# Patient Record
Sex: MALE | Race: WHITE | NOT HISPANIC OR LATINO | Employment: FULL TIME | URBAN - METROPOLITAN AREA
[De-identification: names, ages, dates, MRNs, and addresses within clinical notes are randomized per-mention and may not be internally consistent; named-entity substitution may affect disease eponyms.]

---

## 2017-01-17 ENCOUNTER — TRANSCRIBE ORDERS (OUTPATIENT)
Dept: LAB | Facility: CLINIC | Age: 22
End: 2017-01-17

## 2017-01-17 ENCOUNTER — APPOINTMENT (OUTPATIENT)
Dept: LAB | Facility: CLINIC | Age: 22
End: 2017-01-17
Payer: COMMERCIAL

## 2017-01-17 DIAGNOSIS — B99.9: Primary | ICD-10-CM

## 2017-01-17 LAB
BASOPHILS # BLD AUTO: 0.02 THOUSANDS/ΜL (ref 0–0.1)
BASOPHILS NFR BLD AUTO: 0 % (ref 0–1)
EOSINOPHIL # BLD AUTO: 0.27 THOUSAND/ΜL (ref 0–0.61)
EOSINOPHIL NFR BLD AUTO: 6 % (ref 0–6)
ERYTHROCYTE [DISTWIDTH] IN BLOOD BY AUTOMATED COUNT: 12.5 % (ref 11.6–15.1)
HCT VFR BLD AUTO: 44.6 % (ref 36.5–49.3)
HGB BLD-MCNC: 15.4 G/DL (ref 12–17)
LYMPHOCYTES # BLD AUTO: 1.44 THOUSANDS/ΜL (ref 0.6–4.47)
LYMPHOCYTES NFR BLD AUTO: 29 % (ref 14–44)
MCH RBC QN AUTO: 30.5 PG (ref 26.8–34.3)
MCHC RBC AUTO-ENTMCNC: 34.5 G/DL (ref 31.4–37.4)
MCV RBC AUTO: 88 FL (ref 82–98)
MONOCYTES # BLD AUTO: 0.52 THOUSAND/ΜL (ref 0.17–1.22)
MONOCYTES NFR BLD AUTO: 11 % (ref 4–12)
NEUTROPHILS # BLD AUTO: 2.69 THOUSANDS/ΜL (ref 1.85–7.62)
NEUTS SEG NFR BLD AUTO: 54 % (ref 43–75)
NRBC BLD AUTO-RTO: 0 /100 WBCS
PLATELET # BLD AUTO: 200 THOUSANDS/UL (ref 149–390)
PMV BLD AUTO: 11.2 FL (ref 8.9–12.7)
RBC # BLD AUTO: 5.05 MILLION/UL (ref 3.88–5.62)
WBC # BLD AUTO: 4.95 THOUSAND/UL (ref 4.31–10.16)

## 2017-01-17 PROCEDURE — 36415 COLL VENOUS BLD VENIPUNCTURE: CPT

## 2017-01-17 PROCEDURE — 85025 COMPLETE CBC W/AUTO DIFF WBC: CPT

## 2017-01-26 ENCOUNTER — ALLSCRIPTS OFFICE VISIT (OUTPATIENT)
Dept: OTHER | Facility: OTHER | Age: 22
End: 2017-01-26

## 2017-01-30 ENCOUNTER — ALLSCRIPTS OFFICE VISIT (OUTPATIENT)
Dept: OTHER | Facility: OTHER | Age: 22
End: 2017-01-30

## 2017-02-07 ENCOUNTER — ALLSCRIPTS OFFICE VISIT (OUTPATIENT)
Dept: OTHER | Facility: OTHER | Age: 22
End: 2017-02-07

## 2017-07-27 ENCOUNTER — ALLSCRIPTS OFFICE VISIT (OUTPATIENT)
Dept: OTHER | Facility: OTHER | Age: 22
End: 2017-07-27

## 2017-07-27 ENCOUNTER — GENERIC CONVERSION - ENCOUNTER (OUTPATIENT)
Dept: OTHER | Facility: OTHER | Age: 22
End: 2017-07-27

## 2017-07-27 PROCEDURE — 88305 TISSUE EXAM BY PATHOLOGIST: CPT | Performed by: PHYSICIAN ASSISTANT

## 2017-07-28 ENCOUNTER — LAB REQUISITION (OUTPATIENT)
Dept: LAB | Facility: HOSPITAL | Age: 22
End: 2017-07-28
Payer: COMMERCIAL

## 2017-07-28 DIAGNOSIS — L81.9 DISORDER OF PIGMENTATION: ICD-10-CM

## 2017-07-31 ENCOUNTER — ALLSCRIPTS OFFICE VISIT (OUTPATIENT)
Dept: OTHER | Facility: OTHER | Age: 22
End: 2017-07-31

## 2017-08-02 ENCOUNTER — GENERIC CONVERSION - ENCOUNTER (OUTPATIENT)
Dept: OTHER | Facility: OTHER | Age: 22
End: 2017-08-02

## 2017-08-11 ENCOUNTER — GENERIC CONVERSION - ENCOUNTER (OUTPATIENT)
Dept: OTHER | Facility: OTHER | Age: 22
End: 2017-08-11

## 2018-01-09 ENCOUNTER — GENERIC CONVERSION - ENCOUNTER (OUTPATIENT)
Dept: FAMILY MEDICINE CLINIC | Facility: CLINIC | Age: 23
End: 2018-01-09

## 2018-01-09 NOTE — RESULT NOTES
Verified Results  (1) TISSUE EXAM [id number] 11:15AM Gloria Gomez     Test Name Result Flag Reference   LAB AP CASE REPORT (Report)     Surgical Pathology Report             Case: [id number]                   Authorizing Provider: Manuel Blanc PA-C     Collected:      07/27/2017           Pathologist:      Jaciel Springer MD      Received:      07/28/2017 1257        Specimen:  Skin, Other, Right Mid Back   LAB AP FINAL DIAGNOSIS      A  Skin, Right Mid Back, shave biopsy:  - Seborrheic keratosis, pigmented  Interpretation performed at [place] [address]  Electronically signed by Jaciel Springer MD on 8/1/2017 at 11:15 AM   LAB AP SURGICAL ADDITIONAL INFORMATION (Report)     All controls performed with the immunohistochemical stains reported above   reacted appropriately  These tests were developed and their performance   characteristics determined by [facility] or   [facility]  They may not be cleared or approved by the [place]  Food and Drug Administration  The FDA has determined that such clearance   or approval is not necessary  These tests are used for clinical purposes  They should not be regarded as investigational or for research  This   laboratory has been approved by CLIA 88, designated as a high-complexity   laboratory and is qualified to perform these tests  LAB AP GROSS DESCRIPTION (Report)     A  The specimen is received in formalin, labeled with the patient's name   and hospital number, and is designated right mid back shave  The   specimen consists of one tan non-hairbearing irregular shaped portion of   skin measuring 1 0 x 0 6 cm with attached underlying soft tissue to a   depth of 0 2 cm  The surface exhibits a partially keratotic pigmented   macule which measures 0 7 by 0 5 x 0 2 cm and comes within 0 1 cm of   nearest peripheral margin  The margin of resection is inked green, and a   surface tips are inked red  Entirely submitted  One cassette  Bisected   lengthwise  Note: The estimated total formalin fixation time based upon information   provided by the submitting clinician and the standard processing schedule   is over 72 hours     LAB AP CLINICAL INFORMATION      Pigmented irregular mole right mid back

## 2018-01-10 NOTE — PROGRESS NOTES
Assessment    1  Acute bronchitis, unspecified organism (466 0) (J20 9)   2  Asthma (493 90) (J45 909)   3  Acute laryngitis (464 00) (J04 0)   4  Herpes labialis (054 9) (B00 1)    Plan  Acute bronchitis, unspecified organism    · Azithromycin 250 MG Oral Tablet; TAKE 2 TABLETS ON DAY 1 THEN TAKE 1  TABLET A DAY FOR 4 DAYS with food   · PredniSONE 10 MG Oral Tablet; 6-5-4-3-2-1 taper with food   · Promethazine-Codeine 6 25-10 MG/5ML Oral Syrup; TAKE 1 TSP Every 6 hours  PRN  Asthma    · Respiratory Equipment Nebulizer; Status:Complete;   Done: 27CFA5110  Herpes labialis    · ValACYclovir HCl - 1 GM Oral Tablet; Take 1 tablet daily    Discussion/Summary    Discussed condition with pt  Will treat his acute bronchitis with oral abx, Prednisone taper, cough syrup, and discussed OTC cold meds  To continue regularly prescribed allergy and asthma meds  Fever Care, ER instructions given  F/U 5-7 days if not resolved  I gave pt  Rx for a new nebulizer machine at his request and refilled his Valtrex 1 g Rx for long term use/suppression  Possible side effects of new medications were reviewed with the patient/guardian today  The treatment plan was reviewed with the patient/guardian  The patient/guardian understands and agrees with the treatment plan      Chief Complaint    1  Cold Symptoms  Patient c/o prod  cough (yellow), runny nose and laryngitis x one week  History of Present Illness  HPI: Pt  presents one week history of productive cough worse in the morning, hoarseness, runny nose/congestion intermittently, PND, ST  Denies fever, N/V/D  He has taken Mucinex, Symbicort, nebulizer  Needs Rx for a new nebulizer  Has history of asthma  Does not smoke  Has had a flu shot  His roommate's father passed away and the  was today so the past week has not been good for the pt  Review of Systems    Constitutional: no fever or chills, feels well, no tiredness, no recent weight loss or weight gain     ENT: as noted in HPI  Cardiovascular: no complaints of slow or fast heart rate, no chest pain, no palpitations, no leg claudication or lower extremity edema  Respiratory: as noted in HPI  Gastrointestinal: no complaints of abdominal pain, no constipation, no nausea or vomiting, no diarrhea or bloody stools  Genitourinary: no complaints of dysuria or incontinence, no hesitancy, no nocturia, no genital lesion, no inadequacy of penile erection  Active Problems    1  Asthma (493 90) (J45 909)   2  Esophagitis (530 10) (K20 9)   3  Irritable bowel syndrome (564 1) (K58 9)   4  Vitamin D deficiency (268 9) (E55 9)    Past Medical History    1  History of Abscess of left external ear (380 10) (H60 02)   2  History of Acute bacterial conjunctivitis (372 03) (H10 30)   3  History of Acute bronchitis (466 0) (J20 9)   4  History of Herpes simplex type 1 infection (054 9) (B00 9)   5  History of acute sinusitis (V12 69) (Z87 09)   6  History of conjunctivitis (V12 49) (Z86 69)   7  History of stomatitis (V12 79) (Z87 19)   8  History of Routine lab draw (V72 62) (Z00 00)    Family History    1  No pertinent family history    2  Family history of diabetes mellitus (V18 0) (Z83 3)    Social History    · Never A Smoker   · Social alcohol use (F10 99)  The social history was reviewed and is unchanged  Surgical History    1  History of Frenotomy    Current Meds   1  Allegra Allergy 180 MG Oral Tablet; TAKE 1 TABLET DAILY AS NEEDED; Therapy: 26LZB7028 to Recorded   2  Dicyclomine HCl - 10 MG Oral Capsule; Therapy: 41CEW4936 to (Evaluate:16Waj5984) Recorded   3  Flonase 50 MCG/ACT SUSP; USE 2 SPRAYS IN EACH NOSTRIL ONCE DAILY; Therapy: 33EEV5544 to Recorded   4  Montelukast Sodium 10 MG Oral Tablet; TAKE 1 TABLET BY MOUTH ONCE DAILY; Therapy: 80MZQ5694 to (Evaluate:19Jun2016)  Requested for: 38Kff9947; Last   Rx:83Iui5717 Ordered   5  ProAir  (90 Base) MCG/ACT Inhalation Aerosol Solution;    Therapy: 91IZI5878 to Recorded   6  Symbicort 160-4 5 MCG/ACT Inhalation Aerosol; inhale 2 puffs twice daily  rinse mouth   after use; Therapy: 92PJA9250 to (Anita Head)  Requested for: 86JLS8309; Last   Rx:27Nov2015 Ordered   7  ValACYclovir HCl - 1 GM Oral Tablet; Take 1 tablet daily; Therapy: 71LXG5652 to (Last Rx:55Alt2907)  Requested for: 28Kap4004 Ordered    The medication list was reviewed and updated today  Allergies    1  Cefzil TABS   2  Penicillins    Vitals   Recorded: 95AWV1760 07:24PM   Temperature 99 2 F   Heart Rate 82   Systolic 823   Diastolic 80   Height 5 ft 11 1 in   Weight 171 lb    BMI Calculated 23 78   BSA Calculated 1 98   O2 Saturation 96, RA     Physical Exam    Constitutional   General appearance: No acute distress, well appearing and well nourished  Ears, Nose, Mouth, and Throat   External inspection of ears and nose: Normal     Otoscopic examination: Tympanic membrance translucent with normal light reflex  Canals patent without erythema  Nasal mucosa, septum, and turbinates: Abnormal   B/L boggy erythematous turbinates  Oropharynx: Abnormal   PND and erythema; no exudates  Pulmonary   Respiratory effort: No increased work of breathing or signs of respiratory distress  Auscultation of lungs: Abnormal   B/L diffuse coarse breath sounds; no wheezes  Cardiovascular   Auscultation of heart: Normal rate and rhythm, normal S1 and S2, without murmurs  Lymphatic   Palpation of lymph nodes in neck: No lymphadenopathy  Psychiatric   Orientation to person, place and time: Normal     Mood and affect: Normal          Signatures   Electronically signed by :  Keon Kimball [facility]; Feb 5 2016 12:01PM EST                       (Author)    Electronically signed by : Baudilio Diaz DO; Feb 5 2016 12:12PM EST                       (Co-author)

## 2018-01-12 VITALS
DIASTOLIC BLOOD PRESSURE: 80 MMHG | BODY MASS INDEX: 24.52 KG/M2 | TEMPERATURE: 98.5 F | SYSTOLIC BLOOD PRESSURE: 116 MMHG | WEIGHT: 175.19 LBS | HEIGHT: 71 IN | OXYGEN SATURATION: 98 % | RESPIRATION RATE: 16 BRPM | HEART RATE: 86 BPM

## 2018-01-13 VITALS
HEIGHT: 71 IN | BODY MASS INDEX: 24.58 KG/M2 | RESPIRATION RATE: 16 BRPM | WEIGHT: 175.56 LBS | HEART RATE: 77 BPM | OXYGEN SATURATION: 98 % | DIASTOLIC BLOOD PRESSURE: 78 MMHG | SYSTOLIC BLOOD PRESSURE: 124 MMHG | TEMPERATURE: 98.4 F

## 2018-01-13 VITALS
TEMPERATURE: 97.8 F | OXYGEN SATURATION: 98 % | HEIGHT: 71 IN | HEART RATE: 76 BPM | SYSTOLIC BLOOD PRESSURE: 122 MMHG | WEIGHT: 172.56 LBS | DIASTOLIC BLOOD PRESSURE: 76 MMHG | BODY MASS INDEX: 24.16 KG/M2

## 2018-01-14 VITALS
WEIGHT: 161.38 LBS | DIASTOLIC BLOOD PRESSURE: 74 MMHG | BODY MASS INDEX: 21.39 KG/M2 | HEART RATE: 85 BPM | RESPIRATION RATE: 16 BRPM | SYSTOLIC BLOOD PRESSURE: 110 MMHG | TEMPERATURE: 97.8 F | HEIGHT: 73 IN | OXYGEN SATURATION: 97 %

## 2018-01-22 VITALS
BODY MASS INDEX: 22.99 KG/M2 | RESPIRATION RATE: 16 BRPM | OXYGEN SATURATION: 98 % | TEMPERATURE: 97 F | DIASTOLIC BLOOD PRESSURE: 72 MMHG | HEIGHT: 71 IN | HEART RATE: 68 BPM | WEIGHT: 164.19 LBS | SYSTOLIC BLOOD PRESSURE: 114 MMHG

## 2018-01-24 NOTE — PROGRESS NOTES
Assessment    1  Ganglion of right wrist (357 41) (M67 431)   2  Seborrheic keratosis (522 19) (L82 1)    Plan   Allergic rhinitis, Asthma    · Allegra-D Allergy & Congestion 180-240 MG Oral Tablet Extended Release 24 Hour;  TAKE 1 TABLET DAILY  Asthma    · ProAir  (90 Base) MCG/ACT Inhalation Aerosol Solution; INHALE 2  PUFFS Every 6 hours PRN  Asthma, PMH: History of acute sinusitis    · Montelukast Sodium 10 MG Oral Tablet; TAKE 1 TABLET BY MOUTH ONCE  DAILY  Herpes labialis    · ValACYclovir HCl - 1 GM Oral Tablet; Take 1 tablet daily  Pigmented skin lesion    · (1) TISSUE EXAM; Status:Active; Requested for:25Zgo0311; A : Right midback  A Date/Time: : 28DWC0757 10:13AM  A : Skin Shave  Impression: : Pigmented irregular mole right midback  PMH: History of allergy    · Allegra Allergy 180 MG Oral Tablet (Fexofenadine HCl)  Seborrheic keratosis    · Shave lesion trunk, arms, legs 0 6 - 1 0 cm - POC; Status:Active - Perform Order;  Requested for:67Bcx0364;     2 - [facility]  *  Status: Hold For - Scheduling  Requested for: 44SFQ8503  Ordered; For: Ganglion of right wrist;  Ordered By: Pari Lopez  Performed:   Due: 40WCR5672     Discussion/Summary    Discussed conditions with pt  I will refer him to orthopaedics for consult regarding ganglion cyst of the right wrist ([facility]  Regarding his skin lesion, it was excised and sent out for pathology  To keep biopsy site clean and dry and will call with path report once obtained  The treatment plan was reviewed with the patient/guardian  The patient/guardian understands and agrees with the treatment plan      Chief Complaint  Pt presents with a cyst on his R wrist, pt states he just noticed it yesterday  History of Present Illness  HPI: Pt  presents with two separate issues to discuss today  He reports he has what he believes is a cyst on his right ventral wrist which he noticed yesterday   He denies pain or numbness/paresthesias distally in the hand/fingers but his dexterity is affected when playing the piano  Denies known injury/trauma  He also reports he has a mole on his right mid back that he would like excised  He is concerned about irregular appearance and that it is difficult to monitor since he cannot see it easily and has gotten larger over time  Review of Systems    Constitutional: no fever or chills, feels well, no tiredness, no recent weight loss or weight gain  Cardiovascular: no complaints of slow or fast heart rate, no chest pain, no palpitations, no leg claudication or lower extremity edema  Respiratory: no complaints of shortness of breath, no wheezing or cough, no dyspnea on exertion, no orthopnea or PND  Gastrointestinal: no complaints of abdominal pain, no constipation, no nausea or vomiting, no diarrhea or bloody stools  Genitourinary: no complaints of dysuria or incontinence, no hesitancy, no nocturia, no genital lesion, no inadequacy of penile erection  Musculoskeletal: as noted in HPI  Integumentary: as noted in HPI  Neurological: no complaints of headache, no confusion, no numbness or tingling, no dizziness or fainting  Active Problems    1  Acute bronchitis, unspecified organism (466 0) (J20 9)   2  Acute dysfunction of both eustachian tubes (381 81) (H69 83)   3  Acute laryngitis (464 00) (J04 0)   4  Asthma (493 90) (J45 909)   5  Depression screening (V79 0) (Z13 89)   6  Eczema of left upper extremity (692 9) (L30 9)   7  Esophagitis (530 10) (K20 9)   8  Herpes labialis (054 9) (B00 1)   9  Irritable bowel syndrome (564 1) (K58 9)   10  Tinea corporis (110 5) (B35 4)   11  Vitamin D deficiency (268 9) (E55 9)    Past Medical History    1  History of Abscess of left external ear (380 10) (H60 02)   2  History of Acute bacterial conjunctivitis (372 03) (H10 30)   3  History of Acute bronchitis (466 0) (J20 9)   4   History of Herpes simplex type 1 infection [phone]  9) (B00 9)   5  History of acute sinusitis (V12 69) (Z87 09)   6  History of allergy (V15 09) (Z88 9)   7  History of conjunctivitis (V12 49) (Z86 69)   8  History of stomatitis (V12 79) (Z87 19)   9  History of Lipid screening (V77 91) (Z13 220)   10  History of Need for immunization against influenza (V04 81) (Z23)   11  History of Other fatigue (780 79) (R53 83)   12  History of Routine lab draw (V72 60) (Z00 00)   13  History of Sty, internal, left (373 12) (H00 026)    Family History  Mother    1  No pertinent family history  Father    2  Family history of diabetes mellitus (V18 0) (Z83 3)    Social History    · Never A Smoker   · Social alcohol use (Z78 9)  The social history was reviewed and is unchanged  Surgical History    1  History of Frenotomy    Current Meds   1  Allegra Allergy 180 MG Oral Tablet; TAKE 1 TABLET DAILY AS NEEDED; Therapy: 18IZG9013 to Recorded   2  Clotrimazole-Betamethasone 1-0 05 % External Cream; APPLY  AND RUB  IN A THIN   FILM TO AFFECTED AREAS TWICE DAILY  (AM AND PM); Therapy: 69TIS6084 to (Last Rx:26Jan2017)  Requested for: 26Jan2017 Ordered   3  Dulera 200-5 MCG/ACT Inhalation Aerosol; take 2 puffs every 12 hours; Therapy: 56VQH4609 to (Evaluate:56Opf7511)  Requested for: 65XMS1930; Last   Rx:15Nov2016 Ordered   4  Flonase 50 MCG/ACT SUSP; USE 2 SPRAYS IN EACH NOSTRIL ONCE DAILY; Therapy: 27CND8823 to Recorded   5  Montelukast Sodium 10 MG Oral Tablet; TAKE 1 TABLET BY MOUTH ONCE DAILY; Therapy: 97QTS8149 to (Evaluate:21Jan2018)  Requested for: 71LLJ2992; Last   Rx:26Jan2017 Ordered   6  ProAir  (90 Base) MCG/ACT Inhalation Aerosol Solution; INHALE 2 PUFFS   Every 6 hours PRN; Therapy: 02BJF9929 to (Last MN:11VNB3663)  Requested for: 21NYM5598 Ordered   7  Promethazine VC/Codeine 6 25-5-10 MG/5ML Oral Syrup; TAKE 5 ML EVERY 4 TO 6   HOURS AS NEEDED; Therapy: 94RDH9747 to (Evaluate:60Ufy4092); Last Rx:07Feb2017 Ordered   8   ValACYclovir HCl - 1 GM Oral Tablet; Take 1 tablet daily; Therapy: 55LTM3673 to (Last Rx:26Jan2017)  Requested for: 26Jan2017 Ordered    The medication list was reviewed and updated today  Allergies    1  Cefzil TABS   2  Penicillins    Vitals   Recorded: 27Jul2017 09:37AM   Temperature 97 F, Tympanic   Heart Rate 68   Respiration Quality Normal   Respiration 16   Systolic 816, RUE, Sitting   Diastolic 72, RUE, Sitting   Height 5 ft 11 in   Weight 164 lb 3 oz   BMI Calculated 22 9   BSA Calculated 1 94   O2 Saturation 98, RA   Pain Scale 0     Physical Exam    Constitutional   General appearance: No acute distress, well appearing and well nourished  Musculoskeletal   Inspection/palpation of joints, bones, and muscles: Abnormal   Right ventral wrist with what appears to be a non-tender small ganglion cyst; ROM of hand and wrist intact without difficulty; does not appear inflamed  Skin   Skin and subcutaneous tissue: Abnormal   Right mid back with approx 1 cm irregularly shaped raised pigmented mole with mildly inflamed outer border  Neurologic   Sensation: No sensory loss  Psychiatric   Orientation to person, place and time: Normal     Mood and affect: Normal     Additional Exam:  Vital signs were reviewed  Procedure          Procedure: shave biopsy  Indications for the procedure include the lesion has changed  Risks, benefits and alternatives were discussed with the patient  Procedure Note:  Anesthesia: 1 5 ml of lidocaine 2% with epinephrine  The lesion was located on the left back  The patient was prepped and draped in the usual sterile fashion using alcohol  a shave biopsy of the lesion was taken  The base of the wound was cauterized with cautery  Dressing: The wound was cleaned and a sterile dressing was placed  Specimen: the excised lesion was place in buffered formalin and sent for pathology  Post-Procedure:   Patient Status: the patient tolerated the procedure well   Complications: there were no complications  Follow-up in the office as needed  Signatures   Electronically signed by : JADA Driscoll;  Aug  2 2017  1:57PM EST                       (Author)    Electronically signed by : Denis Webb DO; Aug  2 2017  2:44PM EST

## 2018-02-12 DIAGNOSIS — F32.A DEPRESSION, UNSPECIFIED DEPRESSION TYPE: Primary | ICD-10-CM

## 2018-02-12 RX ORDER — BUPROPION HYDROCHLORIDE 300 MG/1
TABLET ORAL
Qty: 30 TABLET | Refills: 0 | Status: SHIPPED | OUTPATIENT
Start: 2018-02-12 | End: 2018-03-12 | Stop reason: SDUPTHER

## 2018-03-10 DIAGNOSIS — F32.A DEPRESSION, UNSPECIFIED DEPRESSION TYPE: Primary | ICD-10-CM

## 2018-03-11 RX ORDER — BUPROPION HYDROCHLORIDE 150 MG/1
TABLET ORAL
Qty: 30 TABLET | Refills: 1 | Status: SHIPPED | OUTPATIENT
Start: 2018-03-11 | End: 2018-05-20 | Stop reason: SDUPTHER

## 2018-03-12 DIAGNOSIS — F32.A DEPRESSION, UNSPECIFIED DEPRESSION TYPE: ICD-10-CM

## 2018-03-12 RX ORDER — BUPROPION HYDROCHLORIDE 300 MG/1
TABLET ORAL
Qty: 30 TABLET | Refills: 0 | Status: SHIPPED | OUTPATIENT
Start: 2018-03-12 | End: 2018-04-17 | Stop reason: SDUPTHER

## 2018-04-17 DIAGNOSIS — F32.A DEPRESSION, UNSPECIFIED DEPRESSION TYPE: ICD-10-CM

## 2018-04-17 RX ORDER — BUPROPION HYDROCHLORIDE 300 MG/1
TABLET ORAL
Qty: 30 TABLET | Refills: 0 | Status: SHIPPED | OUTPATIENT
Start: 2018-04-17 | End: 2018-05-13 | Stop reason: ALTCHOICE

## 2018-04-24 ENCOUNTER — OFFICE VISIT (OUTPATIENT)
Dept: FAMILY MEDICINE CLINIC | Facility: CLINIC | Age: 23
End: 2018-04-24
Payer: COMMERCIAL

## 2018-04-24 VITALS
OXYGEN SATURATION: 96 % | TEMPERATURE: 98.4 F | WEIGHT: 167.06 LBS | BODY MASS INDEX: 21.44 KG/M2 | DIASTOLIC BLOOD PRESSURE: 78 MMHG | SYSTOLIC BLOOD PRESSURE: 110 MMHG | HEART RATE: 82 BPM | HEIGHT: 74 IN | RESPIRATION RATE: 19 BRPM

## 2018-04-24 DIAGNOSIS — J45.41 MODERATE PERSISTENT ASTHMA WITH ACUTE EXACERBATION: ICD-10-CM

## 2018-04-24 DIAGNOSIS — J20.9 ACUTE BRONCHITIS, UNSPECIFIED ORGANISM: Primary | ICD-10-CM

## 2018-04-24 PROCEDURE — 99213 OFFICE O/P EST LOW 20 MIN: CPT | Performed by: PHYSICIAN ASSISTANT

## 2018-04-24 RX ORDER — PROMETHAZINE HYDROCHLORIDE, PHENYLEPHRINE HYDROCHLORIDE AND CODEINE PHOSPHATE 6.25; 5; 1 MG/5ML; MG/5ML; MG/5ML
5 SOLUTION ORAL
COMMUNITY
Start: 2017-01-30 | End: 2018-04-24

## 2018-04-24 RX ORDER — ALBUTEROL SULFATE 90 UG/1
2 AEROSOL, METERED RESPIRATORY (INHALATION) EVERY 6 HOURS PRN
COMMUNITY
Start: 2013-02-17 | End: 2018-05-13 | Stop reason: SDUPTHER

## 2018-04-24 RX ORDER — FEXOFENADINE HCL AND PSEUDOEPHEDRINE HCI 180; 240 MG/1; MG/1
1 TABLET, EXTENDED RELEASE ORAL DAILY
COMMUNITY
Start: 2017-07-27 | End: 2018-05-02 | Stop reason: SDUPTHER

## 2018-04-24 RX ORDER — VALACYCLOVIR HYDROCHLORIDE 1 G/1
1 TABLET, FILM COATED ORAL DAILY
COMMUNITY
Start: 2014-07-28 | End: 2018-07-27 | Stop reason: SDUPTHER

## 2018-04-24 RX ORDER — CLOTRIMAZOLE AND BETAMETHASONE DIPROPIONATE 10; .64 MG/G; MG/G
CREAM TOPICAL 2 TIMES DAILY
COMMUNITY
Start: 2016-05-20 | End: 2018-04-24

## 2018-04-24 RX ORDER — MONTELUKAST SODIUM 10 MG/1
1 TABLET ORAL DAILY
COMMUNITY
Start: 2014-06-10 | End: 2018-08-16 | Stop reason: SDUPTHER

## 2018-04-24 RX ORDER — FLUTICASONE PROPIONATE 50 MCG
2 SPRAY, SUSPENSION (ML) NASAL DAILY
COMMUNITY
Start: 2013-02-17

## 2018-04-24 RX ORDER — PROMETHAZINE HYDROCHLORIDE, PHENYLEPHRINE HYDROCHLORIDE AND CODEINE PHOSPHATE 6.25; 5; 1 MG/5ML; MG/5ML; MG/5ML
5 SOLUTION ORAL
Qty: 118 ML | Refills: 0 | Status: SHIPPED | OUTPATIENT
Start: 2018-04-24 | End: 2018-05-13 | Stop reason: ALTCHOICE

## 2018-04-24 RX ORDER — GENTAMICIN SULFATE 3 MG/ML
2 SOLUTION/ DROPS OPHTHALMIC EVERY 4 HOURS
COMMUNITY
Start: 2017-07-31 | End: 2018-04-24

## 2018-04-24 RX ORDER — LEVOFLOXACIN 500 MG/1
500 TABLET, FILM COATED ORAL EVERY 24 HOURS
Qty: 10 TABLET | Refills: 0 | Status: SHIPPED | OUTPATIENT
Start: 2018-04-24 | End: 2018-05-04

## 2018-04-24 NOTE — PROGRESS NOTES
Assessment/Plan:         Diagnoses and all orders for this visit:    Acute bronchitis, unspecified organism  -     levofloxacin (LEVAQUIN) 500 mg tablet; Take 1 tablet (500 mg total) by mouth every 24 hours for 10 days  -     Promethazine-Phenyleph-Codeine (PROMETHAZINE VC/CODEINE) 6 25-5-10 MG/5ML SYRP; Take 5 mL by mouth daily at bedtime as needed (cough)    Moderate persistent asthma with acute exacerbation  -     Mometasone Furo-Formoterol Fum (DULERA) 200-5 MCG/ACT AERO; Inhale 2 puffs daily    Other orders  -     fluticasone (FLONASE) 50 mcg/act nasal spray; 2 sprays into each nostril daily      Discussed OTC cold meds  Continue with regularly prescribed allergy/asthma meds  I refilled Dorisann Crooked for pt today  Fever Care, ER instructions given  F/U 5-7 days if not resolved  Chief Complaint   Patient presents with    Cold Like Symptoms     cough, congestion, chills and ST x 3 days  Has not taken anything for Sx's         Subjective:      Patient ID: Osiris Whitlock is a 24 y o  male  Pt presents with 3 day history of nasal congestion, PND, productive cough with brown/green phlegm that is worse at night, chest congestion/tightness/heaviness, wheezing  Denies fever, chills  He is using nebulizer and all of his regularly prescribed allergy and asthma meds  The following portions of the patient's history were reviewed and updated as appropriate: He  has no past medical history on file  He   Patient Active Problem List    Diagnosis Date Noted    Depression 08/23/2017    Allergic rhinitis 07/27/2017    Ganglion of right wrist 07/27/2017    Herpes labialis 02/04/2016    Vitamin D deficiency 12/22/2015    Esophagitis 12/12/2013    Irritable bowel syndrome 08/01/2013    Asthma 02/17/2013     He  has a past surgical history that includes EAR SURGERY (Left); TONSILECTOMY AND ADNOIDECTOMY; and Wrist surgery (Right)    His family history includes Ulster's disease in his father; Diabetes in his father; No Known Problems in his mother  He  reports that he has never smoked  He has never used smokeless tobacco  He reports that he drinks alcohol  His drug history is not on file  Current Outpatient Prescriptions   Medication Sig Dispense Refill    albuterol (PROAIR HFA) 90 mcg/act inhaler Inhale 2 puffs every 6 (six) hours as needed      buPROPion (WELLBUTRIN XL) 150 mg 24 hr tablet TAKE 1 TABLET EVERY DAY 30 tablet 1    buPROPion (WELLBUTRIN XL) 300 mg 24 hr tablet TAKE 1 TABLET BY MOUTH EVERY DAY 30 tablet 0    fexofenadine-pseudoephedrine (ALLEGRA-D 24) 180-240 MG per 24 hr tablet Take 1 tablet by mouth daily      fluticasone (FLONASE) 50 mcg/act nasal spray 2 sprays into each nostril daily      Mometasone Furo-Formoterol Fum (DULERA) 200-5 MCG/ACT AERO Inhale 2 puffs daily 3 Inhaler 1    montelukast (SINGULAIR) 10 mg tablet Take 1 tablet by mouth daily      valACYclovir (VALTREX) 1,000 mg tablet Take 1 tablet by mouth daily      levofloxacin (LEVAQUIN) 500 mg tablet Take 1 tablet (500 mg total) by mouth every 24 hours for 10 days 10 tablet 0    Promethazine-Phenyleph-Codeine (PROMETHAZINE VC/CODEINE) 6 25-5-10 MG/5ML SYRP Take 5 mL by mouth daily at bedtime as needed (cough) 118 mL 0     No current facility-administered medications for this visit  Current Outpatient Prescriptions on File Prior to Visit   Medication Sig    buPROPion (WELLBUTRIN XL) 150 mg 24 hr tablet TAKE 1 TABLET EVERY DAY    buPROPion (WELLBUTRIN XL) 300 mg 24 hr tablet TAKE 1 TABLET BY MOUTH EVERY DAY     No current facility-administered medications on file prior to visit  He is allergic to cefprozil and penicillins       Review of Systems   Constitutional: Negative  HENT: Positive for congestion and postnasal drip  Negative for sore throat  Respiratory: Positive for cough, chest tightness and wheezing  Cardiovascular: Negative  Gastrointestinal: Negative  Genitourinary: Negative            Objective:      BP 110/78 (BP Location: Left arm, Patient Position: Sitting, Cuff Size: Adult)   Pulse 82   Temp 98 4 °F (36 9 °C) (Tympanic)   Resp 19   Ht 6' 2" (1 88 m)   Wt 75 8 kg (167 lb 1 oz)   SpO2 96%   BMI 21 45 kg/m²          Physical Exam   Constitutional: He is oriented to person, place, and time  He appears well-developed and well-nourished  No distress  HENT:   Right Ear: Hearing, tympanic membrane, external ear and ear canal normal    Left Ear: Hearing, tympanic membrane, external ear and ear canal normal    Nose: Mucosal edema (B/L boggy turbinates) present  Mouth/Throat: Mucous membranes are normal  Posterior oropharyngeal erythema (PND) present  No oropharyngeal exudate  Neck: Neck supple  Cardiovascular: Normal rate, regular rhythm and normal heart sounds  Pulmonary/Chest: Effort normal  He has no wheezes  He has rhonchi (B/L diffuse coarse breath sounds heard throughout)  Lymphadenopathy:     He has no cervical adenopathy  Neurological: He is alert and oriented to person, place, and time  Psychiatric: He has a normal mood and affect  Vitals reviewed

## 2018-04-27 PROBLEM — J30.9 ALLERGIC RHINITIS: Status: ACTIVE | Noted: 2017-07-27

## 2018-04-27 PROBLEM — M67.431 GANGLION OF RIGHT WRIST: Status: ACTIVE | Noted: 2017-07-27

## 2018-05-02 DIAGNOSIS — J30.9 ALLERGIC RHINITIS, UNSPECIFIED SEASONALITY, UNSPECIFIED TRIGGER: Primary | ICD-10-CM

## 2018-05-03 RX ORDER — FEXOFENADINE HCL AND PSEUDOEPHEDRINE HCI 180; 240 MG/1; MG/1
TABLET, EXTENDED RELEASE ORAL
Qty: 90 TABLET | Refills: 1 | Status: SHIPPED | OUTPATIENT
Start: 2018-05-03 | End: 2018-05-13 | Stop reason: SDUPTHER

## 2018-05-13 ENCOUNTER — OFFICE VISIT (OUTPATIENT)
Dept: FAMILY MEDICINE CLINIC | Facility: CLINIC | Age: 23
End: 2018-05-13
Payer: COMMERCIAL

## 2018-05-13 VITALS
DIASTOLIC BLOOD PRESSURE: 60 MMHG | HEIGHT: 74 IN | SYSTOLIC BLOOD PRESSURE: 98 MMHG | WEIGHT: 160.8 LBS | RESPIRATION RATE: 18 BRPM | TEMPERATURE: 98.5 F | BODY MASS INDEX: 20.64 KG/M2 | HEART RATE: 81 BPM | OXYGEN SATURATION: 96 %

## 2018-05-13 DIAGNOSIS — J40 BRONCHITIS: ICD-10-CM

## 2018-05-13 DIAGNOSIS — J30.9 ALLERGIC RHINITIS, UNSPECIFIED SEASONALITY, UNSPECIFIED TRIGGER: ICD-10-CM

## 2018-05-13 DIAGNOSIS — J45.909 UNCOMPLICATED ASTHMA, UNSPECIFIED ASTHMA SEVERITY, UNSPECIFIED WHETHER PERSISTENT: ICD-10-CM

## 2018-05-13 DIAGNOSIS — J01.00 ACUTE NON-RECURRENT MAXILLARY SINUSITIS: Primary | ICD-10-CM

## 2018-05-13 PROCEDURE — 99213 OFFICE O/P EST LOW 20 MIN: CPT | Performed by: FAMILY MEDICINE

## 2018-05-13 RX ORDER — ALBUTEROL SULFATE 90 UG/1
2 AEROSOL, METERED RESPIRATORY (INHALATION) EVERY 6 HOURS PRN
Qty: 1 INHALER | Refills: 3 | Status: SHIPPED | OUTPATIENT
Start: 2018-05-13 | End: 2019-10-16 | Stop reason: SDUPTHER

## 2018-05-13 RX ORDER — FEXOFENADINE HCL AND PSEUDOEPHEDRINE HCI 180; 240 MG/1; MG/1
1 TABLET, EXTENDED RELEASE ORAL DAILY
Qty: 90 TABLET | Refills: 1 | Status: SHIPPED | OUTPATIENT
Start: 2018-05-13 | End: 2018-06-27 | Stop reason: SDUPTHER

## 2018-05-13 RX ORDER — DOXYCYCLINE HYCLATE 100 MG/1
100 CAPSULE ORAL EVERY 12 HOURS SCHEDULED
Qty: 20 CAPSULE | Refills: 0 | Status: SHIPPED | OUTPATIENT
Start: 2018-05-13 | End: 2018-05-23

## 2018-05-13 NOTE — PROGRESS NOTES
Assessment/Plan:    No problem-specific Assessment & Plan notes found for this encounter  Diagnoses and all orders for this visit:    Acute non-recurrent maxillary sinusitis  Comments:   Rx doxycycline given  Call further problems  Orders:  -     doxycycline hyclate (VIBRAMYCIN) 100 mg capsule; Take 1 capsule (100 mg total) by mouth every 12 (twelve) hours for 10 days    Bronchitis    Allergic rhinitis, unspecified seasonality, unspecified trigger  Comments:   Allegra D refilled today  Orders:  -     fexofenadine-pseudoephedrine (ALLEGRA-D 24) 180-240 MG per 24 hr tablet; Take 1 tablet by mouth daily    Uncomplicated asthma, unspecified asthma severity, unspecified whether persistent  Comments:   ProAir refilled today  Orders:  -     albuterol (PROAIR HFA) 90 mcg/act inhaler; Inhale 2 puffs every 6 (six) hours as needed for wheezing          Subjective:      Patient ID: Gui Eagle is a 24 y o  male  Pt was seen on 4/24 for URI/sinusitis and was given rx for levaquin w/ benefit    Symptoms have now returned in the past few days  No fevers/chills  The following portions of the patient's history were reviewed and updated as appropriate: allergies, current medications, past family history, past medical history, past social history, past surgical history and problem list     Review of Systems   Constitutional: Negative for chills and fever  HENT: Positive for congestion and postnasal drip  Respiratory: Positive for cough  Negative for shortness of breath  Cardiovascular: Negative  Objective:      BP 98/60   Pulse 81   Temp 98 5 °F (36 9 °C) (Tympanic)   Resp 18   Ht 6' 2" (1 88 m)   Wt 72 9 kg (160 lb 12 8 oz)   SpO2 96%   BMI 20 65 kg/m²          Physical Exam   Constitutional: He appears well-developed and well-nourished  HENT:   Turbinates inflamed   Neck: Normal range of motion  Neck supple     Pulmonary/Chest: Effort normal and breath sounds normal

## 2018-05-20 DIAGNOSIS — F32.A DEPRESSION, UNSPECIFIED DEPRESSION TYPE: ICD-10-CM

## 2018-05-21 RX ORDER — BUPROPION HYDROCHLORIDE 150 MG/1
TABLET ORAL
Qty: 30 TABLET | Refills: 1 | Status: SHIPPED | OUTPATIENT
Start: 2018-05-21 | End: 2018-07-27 | Stop reason: SDUPTHER

## 2018-05-22 DIAGNOSIS — F32.A DEPRESSION, UNSPECIFIED DEPRESSION TYPE: ICD-10-CM

## 2018-05-23 RX ORDER — BUPROPION HYDROCHLORIDE 300 MG/1
TABLET ORAL
Qty: 30 TABLET | Refills: 5 | Status: SHIPPED | OUTPATIENT
Start: 2018-05-23 | End: 2018-05-24 | Stop reason: SDUPTHER

## 2018-05-24 ENCOUNTER — TELEPHONE (OUTPATIENT)
Dept: FAMILY MEDICINE CLINIC | Facility: CLINIC | Age: 23
End: 2018-05-24

## 2018-05-24 DIAGNOSIS — F32.A DEPRESSION, UNSPECIFIED DEPRESSION TYPE: ICD-10-CM

## 2018-05-24 RX ORDER — BUPROPION HYDROCHLORIDE 300 MG/1
300 TABLET ORAL DAILY
Qty: 90 TABLET | Refills: 1 | Status: SHIPPED | OUTPATIENT
Start: 2018-05-24 | End: 2018-10-21 | Stop reason: SDUPTHER

## 2018-05-24 NOTE — TELEPHONE ENCOUNTER
Patient called to request a refill for Wellbutrin XL 24 hour tablet  300 mg for a 3 month supply  Sig:  Take 1 tablet by mouth every day  Please send to the [facility] in [place]

## 2018-06-27 DIAGNOSIS — J30.9 ALLERGIC RHINITIS, UNSPECIFIED SEASONALITY, UNSPECIFIED TRIGGER: ICD-10-CM

## 2018-06-27 RX ORDER — FEXOFENADINE HCL AND PSEUDOEPHEDRINE HCI 180; 240 MG/1; MG/1
TABLET, EXTENDED RELEASE ORAL
Qty: 90 TABLET | Refills: 1 | Status: SHIPPED | OUTPATIENT
Start: 2018-06-27 | End: 2018-09-18 | Stop reason: SDUPTHER

## 2018-07-21 DIAGNOSIS — F32.A DEPRESSION, UNSPECIFIED DEPRESSION TYPE: ICD-10-CM

## 2018-07-23 RX ORDER — BUPROPION HYDROCHLORIDE 150 MG/1
TABLET ORAL
Qty: 30 TABLET | Refills: 1 | OUTPATIENT
Start: 2018-07-23

## 2018-08-16 DIAGNOSIS — J45.909 ASTHMA, UNSPECIFIED ASTHMA SEVERITY, UNSPECIFIED WHETHER COMPLICATED, UNSPECIFIED WHETHER PERSISTENT: ICD-10-CM

## 2018-08-16 DIAGNOSIS — J30.9 ALLERGIC RHINITIS, UNSPECIFIED SEASONALITY, UNSPECIFIED TRIGGER: Primary | ICD-10-CM

## 2018-08-16 RX ORDER — MONTELUKAST SODIUM 10 MG/1
TABLET ORAL
Qty: 90 TABLET | Refills: 3 | Status: SHIPPED | OUTPATIENT
Start: 2018-08-16 | End: 2019-10-16 | Stop reason: SDUPTHER

## 2018-09-18 DIAGNOSIS — J30.9 ALLERGIC RHINITIS, UNSPECIFIED SEASONALITY, UNSPECIFIED TRIGGER: ICD-10-CM

## 2018-09-18 DIAGNOSIS — B00.9 HERPES INFECTION: ICD-10-CM

## 2018-09-19 ENCOUNTER — TELEPHONE (OUTPATIENT)
Dept: FAMILY MEDICINE CLINIC | Facility: CLINIC | Age: 23
End: 2018-09-19

## 2018-09-19 DIAGNOSIS — J45.41 MODERATE PERSISTENT ASTHMA WITH ACUTE EXACERBATION: ICD-10-CM

## 2018-09-19 RX ORDER — FEXOFENADINE HCL AND PSEUDOEPHEDRINE HCI 180; 240 MG/1; MG/1
1 TABLET, EXTENDED RELEASE ORAL DAILY
Qty: 90 TABLET | Refills: 3 | Status: SHIPPED | OUTPATIENT
Start: 2018-09-19 | End: 2018-11-27 | Stop reason: SDUPTHER

## 2018-09-19 RX ORDER — VALACYCLOVIR HYDROCHLORIDE 1 G/1
1000 TABLET, FILM COATED ORAL 2 TIMES DAILY
Qty: 180 TABLET | Refills: 3 | Status: SHIPPED | OUTPATIENT
Start: 2018-09-19 | End: 2019-10-16 | Stop reason: SDUPTHER

## 2018-09-19 NOTE — TELEPHONE ENCOUNTER
Pt returned missed call from prior call asking you to call them before sending rxs to pharmacy  Please call pt back

## 2018-09-20 NOTE — TELEPHONE ENCOUNTER
I spoke with pt  He requested higher quantity for his Valtrex Rx because when he gets a flare, he will increase to BID vs his usual once daily dosing  I gave him new Rx with quantity #180

## 2018-10-21 DIAGNOSIS — F32.A DEPRESSION, UNSPECIFIED DEPRESSION TYPE: ICD-10-CM

## 2018-10-21 DIAGNOSIS — J45.41 MODERATE PERSISTENT ASTHMA WITH ACUTE EXACERBATION: ICD-10-CM

## 2018-10-22 RX ORDER — BUPROPION HYDROCHLORIDE 300 MG/1
TABLET ORAL
Qty: 90 TABLET | Refills: 1 | Status: SHIPPED | OUTPATIENT
Start: 2018-10-22 | End: 2019-10-16 | Stop reason: SDUPTHER

## 2018-10-22 RX ORDER — MOMETASONE FUROATE AND FORMOTEROL FUMARATE DIHYDRATE 200; 5 UG/1; UG/1
2 AEROSOL RESPIRATORY (INHALATION) DAILY
Qty: 39 INHALER | Refills: 1 | Status: SHIPPED | OUTPATIENT
Start: 2018-10-22 | End: 2019-10-16 | Stop reason: SDUPTHER

## 2018-11-27 ENCOUNTER — OFFICE VISIT (OUTPATIENT)
Dept: FAMILY MEDICINE CLINIC | Facility: CLINIC | Age: 23
End: 2018-11-27
Payer: COMMERCIAL

## 2018-11-27 VITALS
SYSTOLIC BLOOD PRESSURE: 124 MMHG | OXYGEN SATURATION: 99 % | TEMPERATURE: 98.7 F | BODY MASS INDEX: 22.18 KG/M2 | RESPIRATION RATE: 18 BRPM | HEIGHT: 74 IN | WEIGHT: 172.8 LBS | HEART RATE: 104 BPM | DIASTOLIC BLOOD PRESSURE: 74 MMHG

## 2018-11-27 DIAGNOSIS — J30.9 ALLERGIC RHINITIS, UNSPECIFIED SEASONALITY, UNSPECIFIED TRIGGER: ICD-10-CM

## 2018-11-27 DIAGNOSIS — J40 BRONCHITIS: Primary | ICD-10-CM

## 2018-11-27 DIAGNOSIS — R10.9 ABDOMINAL CRAMPING: ICD-10-CM

## 2018-11-27 DIAGNOSIS — J45.41 MODERATE PERSISTENT ASTHMA WITH ACUTE EXACERBATION: ICD-10-CM

## 2018-11-27 DIAGNOSIS — R19.5 LOOSE STOOLS: ICD-10-CM

## 2018-11-27 DIAGNOSIS — K14.3 TONGUE COATING: ICD-10-CM

## 2018-11-27 DIAGNOSIS — J02.9 SORE THROAT: ICD-10-CM

## 2018-11-27 DIAGNOSIS — B37.0 THRUSH: ICD-10-CM

## 2018-11-27 PROCEDURE — 99214 OFFICE O/P EST MOD 30 MIN: CPT | Performed by: PHYSICIAN ASSISTANT

## 2018-11-27 PROCEDURE — 87102 FUNGUS ISOLATION CULTURE: CPT | Performed by: PHYSICIAN ASSISTANT

## 2018-11-27 PROCEDURE — 3008F BODY MASS INDEX DOCD: CPT | Performed by: PHYSICIAN ASSISTANT

## 2018-11-27 PROCEDURE — 87106 FUNGI IDENTIFICATION YEAST: CPT | Performed by: PHYSICIAN ASSISTANT

## 2018-11-27 RX ORDER — TIZANIDINE 4 MG/1
TABLET ORAL
Refills: 3 | COMMUNITY
Start: 2018-11-18 | End: 2022-08-03 | Stop reason: SDUPTHER

## 2018-11-27 RX ORDER — DICYCLOMINE HCL 20 MG
20 TABLET ORAL EVERY 6 HOURS
Qty: 30 TABLET | Refills: 0 | Status: SHIPPED | OUTPATIENT
Start: 2018-11-27 | End: 2020-12-14 | Stop reason: ALTCHOICE

## 2018-11-27 RX ORDER — FEXOFENADINE HCL AND PSEUDOEPHEDRINE HCI 180; 240 MG/1; MG/1
1 TABLET, EXTENDED RELEASE ORAL DAILY
Qty: 90 TABLET | Refills: 1 | Status: SHIPPED | OUTPATIENT
Start: 2018-11-27 | End: 2019-10-16 | Stop reason: SDUPTHER

## 2018-11-27 RX ORDER — AZITHROMYCIN 250 MG/1
TABLET, FILM COATED ORAL
Qty: 6 TABLET | Refills: 0 | Status: SHIPPED | OUTPATIENT
Start: 2018-11-27 | End: 2018-11-28 | Stop reason: SDUPTHER

## 2018-11-27 RX ORDER — PREDNISONE 10 MG/1
TABLET ORAL
Qty: 15 TABLET | Refills: 0 | Status: SHIPPED | OUTPATIENT
Start: 2018-11-27 | End: 2019-06-20 | Stop reason: ALTCHOICE

## 2018-11-27 NOTE — PATIENT INSTRUCTIONS
Bentyl as needed for abdomianl cramping and loose stools/diarrhea  START A PROBIOTIC  Take prednisone taper with food  Use nystatin mouth rinse 4 times a day  If you use an inhaler please rinse out mouth well after use

## 2018-11-27 NOTE — PROGRESS NOTES
Assessment/Plan:      Diagnoses and all orders for this visit:    Bronchitis  -     predniSONE 10 mg tablet; 5,4,3,2,1  -     azithromycin (ZITHROMAX) 250 mg tablet; Take 2 tabs PO daily x 1 day, then 1 tab PO daily x 4 days    Sore throat  -     predniSONE 10 mg tablet; 5,4,3,2,1    Moderate persistent asthma with acute exacerbation    Thrush  -     nystatin (MYCOSTATIN) 100,000 units/mL suspension; Apply 5 mL (500,000 Units total) to the mouth or throat 4 (four) times a day  -     Fungal culture; Future  -     Fungal culture    Loose stools  -     dicyclomine (BENTYL) 20 mg tablet; Take 1 tablet (20 mg total) by mouth every 6 (six) hours As needed for diarrhea or abdominal cramping    Abdominal cramping  -     dicyclomine (BENTYL) 20 mg tablet; Take 1 tablet (20 mg total) by mouth every 6 (six) hours As needed for diarrhea or abdominal cramping    Allergic rhinitis, unspecified seasonality, unspecified trigger  Comments:   Allegra D refilled today  Orders:  -     fexofenadine-pseudoephedrine (ALLEGRA-D 24) 180-240 MG per 24 hr tablet; Take 1 tablet by mouth daily    Tongue coating  -     Fungal culture; Future  -     Fungal culture    Other orders  -     tiZANidine (ZANAFLEX) 4 mg tablet; TAKE 1/2 A TABLET EVERY 8 HOURS AS NEEDED       Patient is a 57-year-old male presenting today for multiple issues  His main concern today is an ongoing sore throat as well as some persistent chest tightness and cough and he has been using his nebulizer and his rescue inhaler a lot more than usual   He also notes a white coating on his tongue lately  He is concerned because he states he knows that typically it turned into bronchitis which is not good for him, he states  There is a white coating on his tongue and I do question thrush causing the sore throat and the coating as a result of using his inhalers more  He also uses Mandie Finely for asthma on a regular basis and admits he is not rinse out his mouth consistently    A swab was obtained to send out for fungal culture to confirm  In the meantime I will place him on nystatin rinse and spit regimen  I explained him the importance of rinsing his mouth out after every use of the inhalers  On exam I do not see any evidence of strep and possibly bronchitis but no specific bacterial evidence  I do not feel that he immediately needs an antibiotic at this time but I did give him a printed prescription for azithromycin that he can start his discretion  However, with patient's increased use of his inhalers I  Do feel he  Would benefit from a course of prednisone to help him breathe and help the cough  Risks and benefits and potential side effects of prednisone discussed and he will use a 5 day taper as directed  His Allegra D was refilled for him today  He also has noticed some lower abdominal discomfort and stomach feeling upset as well as some loose stools on occasion with the exact etiology unclear  His exam is unremarkable today  I will try him on a course of Bentyl in addition to a daily probiotic  I am uncertain if this would have anything to do with an oral thrush infection or from the increased use of inhalers  This is why would like to also avoid an antibiotic if possible  He was advised to take the prednisone with food  We will see how patient does with the treatments during this time  We will call him with culture results when available and follow up with him at that time  He certainly can call sooner with any concerns or worsening symptoms despite treatment  Chief Complaint   Patient presents with    Cold Like Symptoms     not a chronic cough, but is coughing very little, chest tightness, sore throat x 4 days post nasal     Diarrhea     x 3 weeks        Subjective:     Patient ID: Paxton Minor is a 24 y o  male  22y/o male here today for sore throat past few weeks  Also persistent cough, chest tightness and chest congestion   Has been using nebulizer and rescue inhaler a lot  Also white coating on tongue  He has dulera he takes daily  He is also using mucinex  Singulair and allegra D  He states he knows how he gets and  Typically will     Also having stomach issues past 3 weeks  Diarrhea (more so loose) and stomach upset  No change in BM routine  Stomach upset at random, lower abdomen  No recent travel outside country  No blood in stool  No recent abx use  No nausea or vomiting  Review of Systems   Constitutional: Positive for fatigue  Negative for fever  HENT: Positive for sore throat  Respiratory: Positive for cough, chest tightness and shortness of breath  Cardiovascular: Negative  Gastrointestinal:        As in HPI   Genitourinary: Negative  Neurological: Negative  Psychiatric/Behavioral: Negative  The following portions of the patient's history were reviewed and updated as appropriate: allergies, current medications, past family history, past medical history, past social history, past surgical history and problem list       Objective:     Physical Exam   Constitutional: He is oriented to person, place, and time  He appears well-developed and well-nourished  No distress  HENT:   Head: Normocephalic  Right Ear: Tympanic membrane and ear canal normal    Left Ear: Tympanic membrane and ear canal normal    Nose: Nose normal    Mouth/Throat: Oropharynx is clear and moist    Tongue with minimal white coating   Neck: Neck supple  Cardiovascular: Normal rate, regular rhythm and normal heart sounds  Pulmonary/Chest: Effort normal and breath sounds normal    Abdominal: Normal appearance and bowel sounds are normal  There is no tenderness  Lymphadenopathy:     He has no cervical adenopathy  Neurological: He is alert and oriented to person, place, and time  Psychiatric: His mood appears anxious  Vitals reviewed        Vitals:    11/27/18 1526   BP: 124/74   BP Location: Left arm   Patient Position: Sitting   Cuff Size: Adult Pulse: 104   Resp: 18   Temp: 98 7 °F (37 1 °C)   TempSrc: Tympanic   SpO2: 99%   Weight: 78 4 kg (172 lb 12 8 oz)   Height: 6' 2" (1 88 m)

## 2018-11-28 DIAGNOSIS — J40 BRONCHITIS: ICD-10-CM

## 2018-11-28 RX ORDER — AZITHROMYCIN 250 MG/1
TABLET, FILM COATED ORAL
Qty: 6 TABLET | Refills: 0 | Status: SHIPPED | OUTPATIENT
Start: 2018-11-28 | End: 2018-11-30 | Stop reason: SDUPTHER

## 2018-11-30 DIAGNOSIS — J40 BRONCHITIS: ICD-10-CM

## 2018-11-30 RX ORDER — AZITHROMYCIN 250 MG/1
TABLET, FILM COATED ORAL
Qty: 6 TABLET | Refills: 0 | Status: SHIPPED | OUTPATIENT
Start: 2018-11-30 | End: 2018-12-05

## 2018-11-30 NOTE — TELEPHONE ENCOUNTER
Pt called req a refill    Azithromycin (ZITHROMAX) 250 mg tablet  Take 2 tabs PO daily x 1 day, then 1 tab PO daily x 4 days  Qty: 6 tablet    Please send to [facility] in [facility], [id number]      Pt stated he apologizes for the inconvenience of requesting this script for the third time  He stated his dog got a hold of the last 4 tabs so now he doesn't have them to take

## 2018-12-10 LAB — FUNGUS SPEC CULT: ABNORMAL

## 2018-12-24 ENCOUNTER — OFFICE VISIT (OUTPATIENT)
Dept: URGENT CARE | Facility: CLINIC | Age: 23
End: 2018-12-24
Payer: COMMERCIAL

## 2018-12-24 ENCOUNTER — TELEPHONE (OUTPATIENT)
Dept: FAMILY MEDICINE CLINIC | Facility: CLINIC | Age: 23
End: 2018-12-24

## 2018-12-24 VITALS
TEMPERATURE: 98.1 F | RESPIRATION RATE: 16 BRPM | SYSTOLIC BLOOD PRESSURE: 128 MMHG | BODY MASS INDEX: 22.59 KG/M2 | DIASTOLIC BLOOD PRESSURE: 66 MMHG | WEIGHT: 166.8 LBS | HEART RATE: 80 BPM | HEIGHT: 72 IN | OXYGEN SATURATION: 99 %

## 2018-12-24 DIAGNOSIS — S05.02XD CORNEAL ABRASION, LEFT, SUBSEQUENT ENCOUNTER: Primary | ICD-10-CM

## 2018-12-24 PROCEDURE — G0382 LEV 3 HOSP TYPE B ED VISIT: HCPCS | Performed by: PHYSICIAN ASSISTANT

## 2018-12-24 PROCEDURE — S9083 URGENT CARE CENTER GLOBAL: HCPCS | Performed by: PHYSICIAN ASSISTANT

## 2018-12-24 NOTE — TELEPHONE ENCOUNTER
Pt's mom called stating pt was in the ER yesterday  Pt was hit in the left eye by the dog's nose  Pt was there as well  Leilani Yuen stated they gave him drops for the pt to use and they are the only thing that seem to be helping with the pain, she is asking if a script can be sent over to the pharmacy for the drops  She could not give me the name of the drops at this time, told me I should be able to see it    Advised the pt should be seen for a follow up, tried to schedule for Wednesday, pt wanted to do virtual appointment today  Advised we don't do virtual appointments here  Leialni Yuen stated "well [facility] does, so why not"  Advised she would have to go online for the virtual appointments   Pt would like a call back to his mom advising what to do, [phone]

## 2018-12-24 NOTE — TELEPHONE ENCOUNTER
The pain relieving eyedrops that are given in the emergency room are only used for the purposes of doing an exam   They are not meant to be continued afterwards  In fact  They are usually strictly advised against after the fact  Those drops can actually mask the symptoms if the eye is getting worse or infection is present    The antibiotic ointment should be continued and ibuprofen up to 800 mg every 8 hr can be taken for pain

## 2018-12-26 NOTE — TELEPHONE ENCOUNTER
Called pt's mom back, advised we did see he went back to urgent care and we wanted to follow up to see how he is feeling today  Called pt, left message on his vm inq how he is feeling today, advised of message from Dr Shae Winston  Also stated if he would like to schedule a follow up, he should give us a call back

## 2018-12-28 NOTE — PROGRESS NOTES
[id number]        NAME: Lauren Gupta is a 24 y o  male  : 1995    MRN: [id number]  DATE: 2018  TIME: 8:08 AM    Assessment and Plan   Corneal abrasion, left, subsequent encounter [S05  02XD]  1  Corneal abrasion, left, subsequent encounter       Pt instructed to stop using the tetracaine that he acquired from the ED  He also requested a prescription for the drops  He was educated on the danger and permanent damage to his vision that could occur from prolonged use of the drops  Encouraged to continue to use the antibiotic ointment and follow up with his ophthalmologist      Patient Instructions     Patient Instructions   Follow up with your ophthalmologist as soon as possible  Motrin and tylenol for pain          Proceed to  ER if symptoms worsen  Chief Complaint     Chief Complaint   Patient presents with    Eye Pain     Pt reports dog head butting him in the left eye  Reports being seen in the ER last night         History of Present Illness       Pt presents with cc of left eye pain  He was evaluated in the ED yesterday after his dog jumped up and his snout hit him in the left eye  His exam yesterday did not show any corneal abrasion but he is being treated as such with antibiotic ointment  He states his eye is very painful and the only thing that helped was the tetracaine that they used in the ED  He denies any change in vision  No pain with eye movement  No FB sensation  Pt admits that he took the tetracaine from the ED and has been using it every couple of hours  Review of Systems   Review of Systems   Constitutional: Negative  Eyes: Positive for pain  Negative for photophobia, discharge, redness, itching and visual disturbance  Skin: Negative  Neurological: Negative            Current Medications       Current Outpatient Prescriptions:     albuterol (PROAIR HFA) 90 mcg/act inhaler, Inhale 2 puffs every 6 (six) hours as needed for wheezing, Disp: 1 Inhaler, Rfl: 3    buPROPion (WELLBUTRIN XL) 150 mg 24 hr tablet, TAKE 1 TABLET BY MOUTH EVERY DAY, Disp: 90 tablet, Rfl: 1    buPROPion (WELLBUTRIN XL) 300 mg 24 hr tablet, TAKE 1 TABLET BY MOUTH EVERY DAY, Disp: 90 tablet, Rfl: 1    dicyclomine (BENTYL) 20 mg tablet, Take 1 tablet (20 mg total) by mouth every 6 (six) hours As needed for diarrhea or abdominal cramping, Disp: 30 tablet, Rfl: 0    DULERA 200-5 MCG/ACT inhaler, INHALE 2 PUFFS DAILY, Disp: 39 Inhaler, Rfl: 1    fexofenadine-pseudoephedrine (ALLEGRA-D 24) 180-240 MG per 24 hr tablet, Take 1 tablet by mouth daily, Disp: 90 tablet, Rfl: 1    fluticasone (FLONASE) 50 mcg/act nasal spray, 2 sprays into each nostril daily, Disp: , Rfl:     montelukast (SINGULAIR) 10 mg tablet, TAKE 1 TABLET BY MOUTH EVERY DAY, Disp: 90 tablet, Rfl: 3    nystatin (MYCOSTATIN) 100,000 units/mL suspension, Apply 5 mL (500,000 Units total) to the mouth or throat 4 (four) times a day, Disp: 473 mL, Rfl: 0    predniSONE 10 mg tablet, 5,4,3,2,1, Disp: 15 tablet, Rfl: 0    tiZANidine (ZANAFLEX) 4 mg tablet, TAKE 1/2 A TABLET EVERY 8 HOURS AS NEEDED, Disp: , Rfl: 3    valACYclovir (VALTREX) 1,000 mg tablet, Take 1 tablet (1,000 mg total) by mouth 2 (two) times a day, Disp: 180 tablet, Rfl: 3    Current Allergies     Allergies as of 12/24/2018 - Reviewed 12/24/2018   Allergen Reaction Noted    Cefprozil Hives 07/13/2012    Penicillins Hives 07/13/2012            The following portions of the patient's history were reviewed and updated as appropriate: allergies, current medications, past family history, past medical history, past social history, past surgical history and problem list      History reviewed  No pertinent past medical history      Past Surgical History:   Procedure Laterality Date    EAR SURGERY Left     TONSILECTOMY AND ADNOIDECTOMY      WRIST SURGERY Right        Family History   Problem Relation Age of Onset    No Known Problems Mother     Diabetes Father     [place]'s disease Father     Substance Abuse Neg Hx     Alcohol abuse Neg Hx          Medications have been verified  Objective   /66 (BP Location: Right arm)   Pulse 80   Temp 98 1 °F (36 7 °C) (Tympanic)   Resp 16   Ht 6' (1 829 m)   Wt 75 7 kg (166 lb 12 8 oz)   SpO2 99%   BMI 22 62 kg/m²        Physical Exam     Physical Exam   Constitutional: He is oriented to person, place, and time  He appears well-developed  No distress  Eyes: Pupils are equal, round, and reactive to light  Conjunctivae and EOM are normal  Right eye exhibits no discharge  Left eye exhibits no discharge  Left upper eyelid with mild swelling  No periorbital erythema  Neck: Normal range of motion  Cardiovascular: Normal rate and regular rhythm  Pulmonary/Chest: Effort normal and breath sounds normal    Neurological: He is alert and oriented to person, place, and time  Skin: Skin is warm and dry  Nursing note and vitals reviewed

## 2019-01-17 DIAGNOSIS — F32.A DEPRESSION, UNSPECIFIED DEPRESSION TYPE: ICD-10-CM

## 2019-01-18 RX ORDER — BUPROPION HYDROCHLORIDE 150 MG/1
TABLET ORAL
Qty: 90 TABLET | Refills: 1 | Status: SHIPPED | OUTPATIENT
Start: 2019-01-18 | End: 2019-06-19 | Stop reason: SDUPTHER

## 2019-04-08 ENCOUNTER — TELEPHONE (OUTPATIENT)
Dept: FAMILY MEDICINE CLINIC | Facility: CLINIC | Age: 24
End: 2019-04-08

## 2019-06-19 DIAGNOSIS — F32.A DEPRESSION, UNSPECIFIED DEPRESSION TYPE: ICD-10-CM

## 2019-06-19 RX ORDER — BUPROPION HYDROCHLORIDE 150 MG/1
150 TABLET ORAL DAILY
Qty: 30 TABLET | Refills: 0 | Status: SHIPPED | OUTPATIENT
Start: 2019-06-19 | End: 2019-07-26 | Stop reason: SDUPTHER

## 2019-06-20 ENCOUNTER — TELEPHONE (OUTPATIENT)
Dept: FAMILY MEDICINE CLINIC | Facility: CLINIC | Age: 24
End: 2019-06-20

## 2019-06-20 DIAGNOSIS — J45.41 MODERATE PERSISTENT ASTHMA WITH ACUTE EXACERBATION: Primary | ICD-10-CM

## 2019-06-20 RX ORDER — METHYLPREDNISOLONE 4 MG/1
TABLET ORAL
Qty: 21 EACH | Refills: 0 | Status: SHIPPED | OUTPATIENT
Start: 2019-06-20 | End: 2020-01-27 | Stop reason: ALTCHOICE

## 2019-06-20 RX ORDER — AZITHROMYCIN 250 MG/1
TABLET, FILM COATED ORAL
Qty: 6 TABLET | Refills: 0 | Status: SHIPPED | OUTPATIENT
Start: 2019-06-20 | End: 2019-06-25

## 2019-06-20 RX ORDER — ALBUTEROL SULFATE 2.5 MG/3ML
2.5 SOLUTION RESPIRATORY (INHALATION) EVERY 6 HOURS PRN
Qty: 25 VIAL | Refills: 0 | Status: SHIPPED | OUTPATIENT
Start: 2019-06-20 | End: 2019-10-16 | Stop reason: SDUPTHER

## 2019-06-28 ENCOUNTER — DOCUMENTATION (OUTPATIENT)
Dept: FAMILY MEDICINE CLINIC | Facility: CLINIC | Age: 24
End: 2019-06-28

## 2019-06-28 ENCOUNTER — TELEPHONE (OUTPATIENT)
Dept: FAMILY MEDICINE CLINIC | Facility: CLINIC | Age: 24
End: 2019-06-28

## 2019-07-26 DIAGNOSIS — F32.A DEPRESSION, UNSPECIFIED DEPRESSION TYPE: ICD-10-CM

## 2019-07-26 RX ORDER — BUPROPION HYDROCHLORIDE 150 MG/1
TABLET ORAL
Qty: 90 TABLET | Refills: 1 | Status: SHIPPED | OUTPATIENT
Start: 2019-07-26 | End: 2019-10-16 | Stop reason: SDUPTHER

## 2019-07-27 DIAGNOSIS — F32.A DEPRESSION, UNSPECIFIED DEPRESSION TYPE: ICD-10-CM

## 2019-07-29 RX ORDER — BUPROPION HYDROCHLORIDE 300 MG/1
TABLET ORAL
Qty: 90 TABLET | Refills: 1 | OUTPATIENT
Start: 2019-07-29

## 2019-09-09 DIAGNOSIS — J45.909 ASTHMA, UNSPECIFIED ASTHMA SEVERITY, UNSPECIFIED WHETHER COMPLICATED, UNSPECIFIED WHETHER PERSISTENT: ICD-10-CM

## 2019-09-09 DIAGNOSIS — J30.9 ALLERGIC RHINITIS, UNSPECIFIED SEASONALITY, UNSPECIFIED TRIGGER: ICD-10-CM

## 2019-09-09 RX ORDER — MONTELUKAST SODIUM 10 MG/1
TABLET ORAL
Qty: 90 TABLET | Refills: 0 | OUTPATIENT
Start: 2019-09-09

## 2019-10-16 ENCOUNTER — OFFICE VISIT (OUTPATIENT)
Dept: FAMILY MEDICINE CLINIC | Facility: CLINIC | Age: 24
End: 2019-10-16
Payer: COMMERCIAL

## 2019-10-16 VITALS
BODY MASS INDEX: 23.06 KG/M2 | HEIGHT: 73 IN | DIASTOLIC BLOOD PRESSURE: 68 MMHG | HEART RATE: 84 BPM | OXYGEN SATURATION: 98 % | SYSTOLIC BLOOD PRESSURE: 104 MMHG | TEMPERATURE: 98.5 F | WEIGHT: 174 LBS

## 2019-10-16 DIAGNOSIS — F32.A DEPRESSION, UNSPECIFIED DEPRESSION TYPE: ICD-10-CM

## 2019-10-16 DIAGNOSIS — Z13.6 SCREENING FOR CARDIOVASCULAR CONDITION: ICD-10-CM

## 2019-10-16 DIAGNOSIS — Z00.00 ANNUAL PHYSICAL EXAM: ICD-10-CM

## 2019-10-16 DIAGNOSIS — J45.41 MODERATE PERSISTENT ASTHMA WITH ACUTE EXACERBATION: ICD-10-CM

## 2019-10-16 DIAGNOSIS — Z13.21 ENCOUNTER FOR VITAMIN DEFICIENCY SCREENING: ICD-10-CM

## 2019-10-16 DIAGNOSIS — Z13.0 SCREENING FOR DEFICIENCY ANEMIA: ICD-10-CM

## 2019-10-16 DIAGNOSIS — Z13.1 SCREENING FOR DIABETES MELLITUS: ICD-10-CM

## 2019-10-16 DIAGNOSIS — Z23 NEED FOR INFLUENZA VACCINATION: Primary | ICD-10-CM

## 2019-10-16 DIAGNOSIS — J45.909 UNCOMPLICATED ASTHMA, UNSPECIFIED ASTHMA SEVERITY, UNSPECIFIED WHETHER PERSISTENT: ICD-10-CM

## 2019-10-16 DIAGNOSIS — Z13.29 SCREENING FOR THYROID DISORDER: ICD-10-CM

## 2019-10-16 DIAGNOSIS — J30.9 ALLERGIC RHINITIS, UNSPECIFIED SEASONALITY, UNSPECIFIED TRIGGER: ICD-10-CM

## 2019-10-16 DIAGNOSIS — B00.9 HERPES INFECTION: ICD-10-CM

## 2019-10-16 PROCEDURE — 90686 IIV4 VACC NO PRSV 0.5 ML IM: CPT | Performed by: FAMILY MEDICINE

## 2019-10-16 PROCEDURE — 99395 PREV VISIT EST AGE 18-39: CPT | Performed by: FAMILY MEDICINE

## 2019-10-16 PROCEDURE — 90471 IMMUNIZATION ADMIN: CPT | Performed by: FAMILY MEDICINE

## 2019-10-16 RX ORDER — MONTELUKAST SODIUM 10 MG/1
10 TABLET ORAL DAILY
Qty: 90 TABLET | Refills: 3 | Status: SHIPPED | OUTPATIENT
Start: 2019-10-16 | End: 2020-12-14 | Stop reason: SDUPTHER

## 2019-10-16 RX ORDER — FEXOFENADINE HCL AND PSEUDOEPHEDRINE HCI 180; 240 MG/1; MG/1
1 TABLET, EXTENDED RELEASE ORAL DAILY
Qty: 90 TABLET | Refills: 3 | Status: SHIPPED | OUTPATIENT
Start: 2019-10-16 | End: 2020-11-02

## 2019-10-16 RX ORDER — ALBUTEROL SULFATE 90 UG/1
2 AEROSOL, METERED RESPIRATORY (INHALATION) EVERY 6 HOURS PRN
Qty: 1 INHALER | Refills: 3 | Status: SHIPPED | OUTPATIENT
Start: 2019-10-16 | End: 2020-01-27 | Stop reason: SDUPTHER

## 2019-10-16 RX ORDER — BUPROPION HYDROCHLORIDE 300 MG/1
300 TABLET ORAL DAILY
Qty: 90 TABLET | Refills: 3 | Status: SHIPPED | OUTPATIENT
Start: 2019-10-16 | End: 2020-10-06

## 2019-10-16 RX ORDER — ALBUTEROL SULFATE 2.5 MG/3ML
2.5 SOLUTION RESPIRATORY (INHALATION) EVERY 6 HOURS PRN
Qty: 25 VIAL | Refills: 6 | Status: SHIPPED | OUTPATIENT
Start: 2019-10-16 | End: 2020-07-16 | Stop reason: SDUPTHER

## 2019-10-16 RX ORDER — BUPROPION HYDROCHLORIDE 150 MG/1
150 TABLET ORAL DAILY
Qty: 90 TABLET | Refills: 3 | Status: SHIPPED | OUTPATIENT
Start: 2019-10-16 | End: 2020-12-14 | Stop reason: SDUPTHER

## 2019-10-16 RX ORDER — VALACYCLOVIR HYDROCHLORIDE 1 G/1
1000 TABLET, FILM COATED ORAL 2 TIMES DAILY
Qty: 180 TABLET | Refills: 3 | Status: SHIPPED | OUTPATIENT
Start: 2019-10-16 | End: 2020-10-06

## 2019-10-16 NOTE — ASSESSMENT & PLAN NOTE
Physical exam unremarkable today  Chronic medical problems reviewed  Flu vaccine given today  Other immunizations and screenings up-to-date  Routine screening blood work ordered

## 2019-10-16 NOTE — PROGRESS NOTES
[facility]    NAME: Michelle Biswas  AGE: 21 y o  SEX: male  : 1995     DATE: 10/16/2019     Assessment and Plan:     Problem List Items Addressed This Visit     Allergic rhinitis    Relevant Medications    fexofenadine-pseudoephedrine (ALLEGRA-D 24) 180-240 MG per 24 hr tablet    montelukast (SINGULAIR) 10 mg tablet    Annual physical exam     Physical exam unremarkable today  Chronic medical problems reviewed  Flu vaccine given today  Other immunizations and screenings up-to-date  Routine screening blood work ordered  Asthma    Relevant Medications    albuterol (2 5 mg/3 mL) 0 083 % nebulizer solution    albuterol (PROAIR HFA) 90 mcg/act inhaler    mometasone-formoterol (DULERA) 200-5 MCG/ACT inhaler    montelukast (SINGULAIR) 10 mg tablet    Depression    Relevant Medications    buPROPion (WELLBUTRIN XL) 150 mg 24 hr tablet    buPROPion (WELLBUTRIN XL) 300 mg 24 hr tablet      Other Visit Diagnoses     Need for influenza vaccination    -  Primary    Relevant Orders    influenza vaccine, 4812-7061, quadrivalent, 0 5 mL, preservative-free, for adult and pediatric patients 6 mos+ (AFLURIA, FLUARIX, FLULAVAL, FLUZONE) (Completed)    Herpes infection        Relevant Medications    valACYclovir (VALTREX) 1,000 mg tablet    Screening for cardiovascular condition        Relevant Orders    Lipid Panel with Direct LDL reflex    Screening for diabetes mellitus        Relevant Orders    Comprehensive metabolic panel    Screening for deficiency anemia        Relevant Orders    CBC and differential    Screening for thyroid disorder        Relevant Orders    TSH, 3rd generation with Free T4 reflex    Encounter for vitamin deficiency screening        Relevant Orders    Vitamin D 25 hydroxy          Immunizations and preventive care screenings were discussed with patient today   Appropriate education was printed on patient's after visit summary  Counseling:  Dental Health: discussed importance of regular tooth brushing, flossing, and dental visits  · Exercise: the importance of regular exercise/physical activity was discussed  Recommend exercise 3-5 times per week for at least 30 minutes  Return if symptoms worsen or fail to improve  Chief Complaint:     Chief Complaint   Patient presents with    Physical Exam     Pt is here for a physical exam  Pt has no new complains at this moment  History of Present Illness:     Adult Annual Physical   Patient here for a comprehensive physical exam  The patient reports Chronic allergic and asthmatic symptoms       Diet and Physical Activity  · Diet/Nutrition: well balanced diet  · Exercise: moderate cardiovascular exercise and 3-4 times a week on average  Depression Screening  PHQ-9 Depression Screening    PHQ-9:    Frequency of the following problems over the past two weeks:            General Health  · Sleep: sleeps well  · Hearing: normal - bilateral   · Vision: no vision problems  · Dental: regular dental visits   Health  · History of STDs?: no      Review of Systems:     Review of Systems   Constitutional: Negative  HENT: Positive for postnasal drip and rhinorrhea  Negative for congestion, ear pain, hearing loss, nosebleeds, sore throat and trouble swallowing  Eyes: Negative  Respiratory: Negative for apnea, cough, chest tightness, shortness of breath and wheezing  Cardiovascular: Negative  Gastrointestinal: Negative for abdominal pain, blood in stool, constipation, diarrhea, nausea and vomiting  Endocrine: Negative  Genitourinary: Negative for difficulty urinating, dysuria, frequency, hematuria and urgency  Musculoskeletal: Negative for arthralgias, joint swelling and myalgias  Skin: Negative for rash  Neurological: Negative for dizziness, syncope, light-headedness, numbness and headaches  Hematological: Negative  Psychiatric/Behavioral: Negative for confusion, dysphoric mood and sleep disturbance  The patient is not nervous/anxious  Past Medical History:     History reviewed  No pertinent past medical history     Past Surgical History:     Past Surgical History:   Procedure Laterality Date    EAR SURGERY Left     TONSILECTOMY AND ADNOIDECTOMY      WRIST SURGERY Right       Social History:     Social History     Socioeconomic History    Marital status: Single     Spouse name: None    Number of children: None    Years of education: None    Highest education level: None   Occupational History    None   Social Needs    Financial resource strain: None    Food insecurity:     Worry: None     Inability: None    Transportation needs:     Medical: None     Non-medical: None   Tobacco Use    Smoking status: Never Smoker    Smokeless tobacco: Never Used   Substance and Sexual Activity    Alcohol use: Yes     Frequency: 2-4 times a month     Drinks per session: 3 or 4     Binge frequency: Never     Comment: social    Drug use: Never    Sexual activity: None   Lifestyle    Physical activity:     Days per week: None     Minutes per session: None    Stress: None   Relationships    Social connections:     Talks on phone: None     Gets together: None     Attends [employer]ism service: None     Active member of club or organization: None     Attends meetings of clubs or organizations: None     Relationship status: None    Intimate partner violence:     Fear of current or ex partner: None     Emotionally abused: None     Physically abused: None     Forced sexual activity: None   Other Topics Concern    None   Social History Narrative    None      Family History:     Family History   Problem Relation Age of Onset    No Known Problems Mother     Diabetes Father     Taiwo's disease Father     Substance Abuse Neg Hx     Alcohol abuse Neg Hx       Current Medications:     Current Outpatient Medications   Medication Sig Dispense Refill    albuterol (2 5 mg/3 mL) 0 083 % nebulizer solution Take 1 vial (2 5 mg total) by nebulization every 6 (six) hours as needed for wheezing or shortness of breath 25 vial 6    albuterol (PROAIR HFA) 90 mcg/act inhaler Inhale 2 puffs every 6 (six) hours as needed for wheezing 1 Inhaler 3    buPROPion (WELLBUTRIN XL) 150 mg 24 hr tablet Take 1 tablet (150 mg total) by mouth daily 90 tablet 3    buPROPion (WELLBUTRIN XL) 300 mg 24 hr tablet Take 1 tablet (300 mg total) by mouth daily 90 tablet 3    fexofenadine-pseudoephedrine (ALLEGRA-D 24) 180-240 MG per 24 hr tablet Take 1 tablet by mouth daily 90 tablet 3    fluticasone (FLONASE) 50 mcg/act nasal spray 2 sprays into each nostril daily      mometasone-formoterol (DULERA) 200-5 MCG/ACT inhaler Inhale 2 puffs daily 39 Inhaler 3    montelukast (SINGULAIR) 10 mg tablet Take 1 tablet (10 mg total) by mouth daily 90 tablet 3    Respiratory Therapy Supplies (NEBULIZER COMPRESSOR) KIT Use every 4 to 6 hours as needed  1 each 0    tiZANidine (ZANAFLEX) 4 mg tablet TAKE 1/2 A TABLET EVERY 8 HOURS AS NEEDED  3    valACYclovir (VALTREX) 1,000 mg tablet Take 1 tablet (1,000 mg total) by mouth 2 (two) times a day 180 tablet 3    dicyclomine (BENTYL) 20 mg tablet Take 1 tablet (20 mg total) by mouth every 6 (six) hours As needed for diarrhea or abdominal cramping (Patient not taking: Reported on 10/16/2019) 30 tablet 0    methylPREDNISolone 4 MG tablet therapy pack Use as directed on package (Patient not taking: Reported on 10/16/2019) 21 each 0    nystatin (MYCOSTATIN) 100,000 units/mL suspension Apply 5 mL (500,000 Units total) to the mouth or throat 4 (four) times a day (Patient not taking: Reported on 10/16/2019) 473 mL 0     No current facility-administered medications for this visit  Allergies:      Allergies   Allergen Reactions    Cefprozil Hives    Penicillins Hives      Physical Exam:     /68 (BP Location: Left arm, Patient Position: Sitting, Cuff Size: Adult)   Pulse 84   Temp 98 5 °F (36 9 °C) (Tympanic)   Ht 6' 1" (1 854 m)   Wt 78 9 kg (174 lb)   SpO2 98%   BMI 22 96 kg/m²     Physical Exam   Constitutional: He is oriented to person, place, and time  He appears well-developed and well-nourished  No distress  HENT:   Head: Normocephalic and atraumatic  Eyes: Pupils are equal, round, and reactive to light  Conjunctivae are normal  Right eye exhibits no discharge  Neck: Normal range of motion  No thyromegaly present  Cardiovascular: Normal rate and regular rhythm  Pulmonary/Chest: Effort normal and breath sounds normal  No respiratory distress  Lymphadenopathy:     He has no cervical adenopathy  Neurological: He is alert and oriented to person, place, and time  Skin: Skin is warm and dry  He is not diaphoretic  Psychiatric: He has a normal mood and affect  His behavior is normal  Judgment and thought content normal    Nursing note and vitals reviewed        Charlie Mojica DO    [address] [facility] [address]

## 2019-10-16 NOTE — PATIENT INSTRUCTIONS

## 2019-10-21 ENCOUNTER — TELEPHONE (OUTPATIENT)
Dept: FAMILY MEDICINE CLINIC | Facility: CLINIC | Age: 24
End: 2019-10-21

## 2019-10-22 DIAGNOSIS — J45.41 MODERATE PERSISTENT ASTHMA WITH ACUTE EXACERBATION: Primary | ICD-10-CM

## 2019-10-22 RX ORDER — FLUTICASONE FUROATE AND VILANTEROL 100; 25 UG/1; UG/1
1 POWDER RESPIRATORY (INHALATION) DAILY
Qty: 1 INHALER | Refills: 3 | Status: SHIPPED | OUTPATIENT
Start: 2019-10-22 | End: 2020-03-17 | Stop reason: SDUPTHER

## 2019-10-22 NOTE — TELEPHONE ENCOUNTER
PT NEEDS A PRIOR AUTH FOR [address]  I STARTED THE AUTH BUT IT WILL DENY BECAUSE PT HAS TO TRY 3 FORMULARY ALTERNATIVES BEFORE APPROVING [address]  PT SAID HE HAS ONLY TRIED 1 FORMULARY WHICH IS [facility]MBICORT (WAS INEFFECTIVE)  HE WILL NOW NEED TO TRY BREO AND ADVAIR  HE SAID TO SEND ONE OF THESE TO [facility]   HE HAS NEVER TRIED EITHER ONE

## 2019-10-22 NOTE — TELEPHONE ENCOUNTER
Pt notified  He is going to call his insurance because Sara Rod really only works for him, to see if there is anything he can do to get the coverage

## 2019-10-23 DIAGNOSIS — J45.41 MODERATE PERSISTENT ASTHMA WITH ACUTE EXACERBATION: ICD-10-CM

## 2019-10-23 NOTE — TELEPHONE ENCOUNTER
Pt asked we send in rx for [facility] inhaler  Pt stated [facility] and other inhaler alternatives are not covered by insurance  Stated pharmacy does have a coupon for the [facility] so he will try that  Verified with the pharmacy they do not have a script for [facility]

## 2020-01-27 ENCOUNTER — OFFICE VISIT (OUTPATIENT)
Dept: FAMILY MEDICINE CLINIC | Facility: CLINIC | Age: 25
End: 2020-01-27
Payer: COMMERCIAL

## 2020-01-27 ENCOUNTER — TELEPHONE (OUTPATIENT)
Dept: FAMILY MEDICINE CLINIC | Facility: CLINIC | Age: 25
End: 2020-01-27

## 2020-01-27 VITALS
DIASTOLIC BLOOD PRESSURE: 78 MMHG | HEIGHT: 69 IN | TEMPERATURE: 98.1 F | WEIGHT: 174.2 LBS | BODY MASS INDEX: 25.8 KG/M2 | OXYGEN SATURATION: 97 % | SYSTOLIC BLOOD PRESSURE: 116 MMHG | HEART RATE: 113 BPM

## 2020-01-27 DIAGNOSIS — J01.90 ACUTE BACTERIAL SINUSITIS: Primary | ICD-10-CM

## 2020-01-27 DIAGNOSIS — J45.909 UNCOMPLICATED ASTHMA, UNSPECIFIED ASTHMA SEVERITY, UNSPECIFIED WHETHER PERSISTENT: ICD-10-CM

## 2020-01-27 DIAGNOSIS — B96.89 ACUTE BACTERIAL SINUSITIS: Primary | ICD-10-CM

## 2020-01-27 PROCEDURE — 3008F BODY MASS INDEX DOCD: CPT | Performed by: FAMILY MEDICINE

## 2020-01-27 PROCEDURE — 99213 OFFICE O/P EST LOW 20 MIN: CPT | Performed by: FAMILY MEDICINE

## 2020-01-27 PROCEDURE — 1036F TOBACCO NON-USER: CPT | Performed by: FAMILY MEDICINE

## 2020-01-27 RX ORDER — LEVOFLOXACIN 500 MG/1
500 TABLET, FILM COATED ORAL EVERY 24 HOURS
Qty: 10 TABLET | Refills: 0 | Status: SHIPPED | OUTPATIENT
Start: 2020-01-27 | End: 2020-02-06

## 2020-01-27 RX ORDER — ALBUTEROL SULFATE 90 UG/1
2 AEROSOL, METERED RESPIRATORY (INHALATION) EVERY 6 HOURS PRN
Qty: 1 INHALER | Refills: 3 | Status: SHIPPED | OUTPATIENT
Start: 2020-01-27 | End: 2020-12-14 | Stop reason: SDUPTHER

## 2020-01-27 RX ORDER — PREDNISONE 10 MG/1
TABLET ORAL
Qty: 30 TABLET | Refills: 0 | Status: SHIPPED | OUTPATIENT
Start: 2020-01-27 | End: 2020-03-17 | Stop reason: ALTCHOICE

## 2020-01-27 NOTE — TELEPHONE ENCOUNTER
Ja Castaneda from pharmacy called about a rx interaction for levofloxacin  States taking that with tizanidine can cause pt prolongation and wants to know if she should still fill rx for levofloxacin

## 2020-01-27 NOTE — PROGRESS NOTES
Assessment/Plan:    Patient is a 54-year-old male with sinus infection  I have prescribed levofloxacin and a   Tapering course of prednisone  Patient has history of chronic sinus infections  Diagnoses and all orders for this visit:    Acute bacterial sinusitis  -     levofloxacin (LEVAQUIN) 500 mg tablet; Take 1 tablet (500 mg total) by mouth every 24 hours for 10 days  -     predniSONE 10 mg tablet; Take 5 tabs x 2d, 4 tabs x 2d, 3 tabs x 2d, 2 tabs x 2d, 1 tab x 2d    Uncomplicated asthma, unspecified asthma severity, unspecified whether persistent  Comments:   ProAir refilled today  Orders:  -     albuterol (PROAIR HFA) 90 mcg/act inhaler; Inhale 2 puffs every 6 (six) hours as needed for wheezing          Subjective:   Chief Complaint   Patient presents with    Sinus Problem     Patient c/o congestion, sore throat, coughing up mucous, trouble sleeping        Patient ID: Amy Walter is a 31 y o  male  Patient is here with sinus complaints      The following portions of the patient's history were reviewed and updated as appropriate: allergies, current medications, past family history, past medical history, past social history, past surgical history and problem list     Review of Systems   Constitutional: Positive for chills and fatigue  HENT: Positive for congestion and sinus pain  Respiratory: Positive for cough  Musculoskeletal: Positive for back pain  Objective:      /78 (BP Location: Left arm, Patient Position: Sitting, Cuff Size: Adult)   Pulse (!) 113   Temp 98 1 °F (36 7 °C) (Tympanic)   Ht 5' 8 75" (1 746 m)   Wt 79 kg (174 lb 3 2 oz)   SpO2 97%   BMI 25 91 kg/m²          Physical Exam   Constitutional: He appears well-developed  No distress  HENT:   Right Ear: Tympanic membrane normal    Left Ear: Tympanic membrane normal    Nose: Mucosal edema present  Right sinus exhibits maxillary sinus tenderness and frontal sinus tenderness   Left sinus exhibits maxillary sinus tenderness and frontal sinus tenderness  Mouth/Throat: Posterior oropharyngeal erythema present  No oropharyngeal exudate  Nursing note and vitals reviewed

## 2020-03-16 ENCOUNTER — TELEPHONE (OUTPATIENT)
Dept: FAMILY MEDICINE CLINIC | Facility: CLINIC | Age: 25
End: 2020-03-16

## 2020-03-16 NOTE — TELEPHONE ENCOUNTER
Patient called the office c/o of a cough with no congestion or sinus drip; just chest tightness and would like Levoquil and prednisone called into his pharmacy  He will be using [facility] [address] [place], [place]  Patient states he does not want to come in a compromise himself with everything going on  Please advise

## 2020-03-16 NOTE — TELEPHONE ENCOUNTER
Call patient  He was just prescribed this medication less than 6 weeks ago  I cannot prescribe them again without seeing him  Our facilities are taking precautions so that patients are not exposed to each other   He may need to go to an Urgent Care if he is in [place]

## 2020-03-17 ENCOUNTER — TELEPHONE (OUTPATIENT)
Dept: FAMILY MEDICINE CLINIC | Facility: CLINIC | Age: 25
End: 2020-03-17

## 2020-03-17 ENCOUNTER — OFFICE VISIT (OUTPATIENT)
Dept: FAMILY MEDICINE CLINIC | Facility: CLINIC | Age: 25
End: 2020-03-17
Payer: COMMERCIAL

## 2020-03-17 VITALS
HEIGHT: 73 IN | DIASTOLIC BLOOD PRESSURE: 70 MMHG | BODY MASS INDEX: 22.66 KG/M2 | HEART RATE: 84 BPM | OXYGEN SATURATION: 97 % | SYSTOLIC BLOOD PRESSURE: 126 MMHG | TEMPERATURE: 98.5 F | WEIGHT: 171 LBS

## 2020-03-17 DIAGNOSIS — J40 BRONCHITIS: Primary | ICD-10-CM

## 2020-03-17 DIAGNOSIS — J45.41 MODERATE PERSISTENT ASTHMA WITH ACUTE EXACERBATION: ICD-10-CM

## 2020-03-17 PROCEDURE — 3008F BODY MASS INDEX DOCD: CPT | Performed by: FAMILY MEDICINE

## 2020-03-17 PROCEDURE — 99213 OFFICE O/P EST LOW 20 MIN: CPT | Performed by: FAMILY MEDICINE

## 2020-03-17 PROCEDURE — 1036F TOBACCO NON-USER: CPT | Performed by: FAMILY MEDICINE

## 2020-03-17 RX ORDER — DEXTROMETHORPHAN HYDROBROMIDE AND PROMETHAZINE HYDROCHLORIDE 15; 6.25 MG/5ML; MG/5ML
5 SOLUTION ORAL EVERY 6 HOURS PRN
Qty: 118 ML | Refills: 0 | Status: SHIPPED | OUTPATIENT
Start: 2020-03-17 | End: 2020-12-14 | Stop reason: ALTCHOICE

## 2020-03-17 RX ORDER — METHYLPREDNISOLONE 4 MG/1
TABLET ORAL
Qty: 21 EACH | Refills: 0 | Status: SHIPPED | OUTPATIENT
Start: 2020-03-17 | End: 2020-12-14 | Stop reason: ALTCHOICE

## 2020-03-17 RX ORDER — AZITHROMYCIN 250 MG/1
TABLET, FILM COATED ORAL
Qty: 6 TABLET | Refills: 0 | Status: SHIPPED | OUTPATIENT
Start: 2020-03-17 | End: 2020-03-22

## 2020-03-17 RX ORDER — FLUTICASONE FUROATE AND VILANTEROL 100; 25 UG/1; UG/1
1 POWDER RESPIRATORY (INHALATION) DAILY
Qty: 1 INHALER | Refills: 3 | Status: SHIPPED | OUTPATIENT
Start: 2020-03-17 | End: 2020-12-14 | Stop reason: ALTCHOICE

## 2020-03-17 NOTE — PROGRESS NOTES
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, could possibly be exposed to corona virus  He does not know of any true exposure  He denies fever  He has tightness on his chest and cough as when he has a flare of his asthma  His lungs are clear  He is afebrile  I have given him a prescription for azithromycin and a Medrol Dosepak in case his symptoms do worsen  Certainly if he starts with fever her learns of any true exposure, he should call us and he can be referred if necessary for testing  He also asked for a prescription for [place]  He is not sure which 1 will be covered by his prescription plan  He will get the cheapest one filled  He believes he has enough albuterol at home  Patient appears to be rather anxious over the corona virus  I tried to allay his fears, that we just must take care of ourselves, practice good hygiene  Diagnoses and all orders for this visit:    Bronchitis  -     azithromycin (ZITHROMAX) 250 mg tablet; Take two tabs today and then one daily for 4 days  -     methylPREDNISolone 4 MG tablet therapy pack; Use as directed on package  -     Promethazine-DM (PHENERGAN-DM) 6 25-15 mg/5 mL oral syrup; Take 5 mL by mouth every 6 (six) hours as needed for cough    Moderate persistent asthma with acute exacerbation  -     mometasone-formoterol (Dulera) 200-5 MCG/ACT inhaler; Inhale 2 puffs daily  -     fluticasone-vilanterol (BREO ELLIPTA) 100-25 mcg/inh inhaler; Inhale 1 puff daily Rinse mouth after use  Subjective:   Chief Complaint   Patient presents with    Follow-up     Pt is here for a med chek  Pt c/o cough,         Patient ID: Betsey Ricci is a 31 y o  male  Patient is here with cough, post nasal drainage,  He is very concerned because of history of asthma        The following portions of the patient's history were reviewed and updated as appropriate: allergies, current medications, past family history, past medical history, past social history, past surgical history and problem list     Review of Systems   Constitutional: Negative for chills and fever  HENT: Positive for congestion and postnasal drip  Negative for sore throat  Respiratory: Positive for cough and chest tightness  Cardiovascular: Negative for chest pain and palpitations  Gastrointestinal: Negative for abdominal pain, constipation, diarrhea and nausea  Genitourinary: Negative for difficulty urinating  Skin: Negative  Neurological: Negative for dizziness and headaches  Psychiatric/Behavioral: Negative  Objective:      /70 (BP Location: Right arm, Patient Position: Sitting, Cuff Size: Adult)   Pulse 84   Temp 98 5 °F (36 9 °C) (Tympanic)   Ht 6' 1" (1 854 m)   Wt 77 6 kg (171 lb)   SpO2 97%   BMI 22 56 kg/m²          Physical Exam   Constitutional: He is oriented to person, place, and time  He appears well-developed  No distress  HENT:     TMs okay  Throat slightly erythematous  Clear post nasal drainage  Nasal turbinates mildly inflamed  Neck: No thyromegaly present  Cardiovascular: Normal rate, regular rhythm and normal heart sounds  Pulmonary/Chest: Effort normal and breath sounds normal  He has no wheezes  Lymphadenopathy:     He has no cervical adenopathy  Neurological: He is alert and oriented to person, place, and time  Skin: Skin is warm and dry  Psychiatric: His mood appears anxious  His speech is rapid and/or pressured  Nursing note and vitals reviewed

## 2020-05-20 ENCOUNTER — TELEMEDICINE (OUTPATIENT)
Dept: FAMILY MEDICINE CLINIC | Facility: CLINIC | Age: 25
End: 2020-05-20
Payer: COMMERCIAL

## 2020-05-20 DIAGNOSIS — Z20.822 EXPOSURE TO COVID-19 VIRUS: Primary | ICD-10-CM

## 2020-05-20 PROCEDURE — 99213 OFFICE O/P EST LOW 20 MIN: CPT | Performed by: FAMILY MEDICINE

## 2020-07-16 DIAGNOSIS — J45.41 MODERATE PERSISTENT ASTHMA WITH ACUTE EXACERBATION: Primary | ICD-10-CM

## 2020-07-16 DIAGNOSIS — J45.41 MODERATE PERSISTENT ASTHMA WITH ACUTE EXACERBATION: ICD-10-CM

## 2020-07-16 RX ORDER — ALBUTEROL SULFATE 2.5 MG/3ML
2.5 SOLUTION RESPIRATORY (INHALATION) EVERY 6 HOURS PRN
Qty: 25 VIAL | Refills: 6 | Status: SHIPPED | OUTPATIENT
Start: 2020-07-16 | End: 2020-07-21 | Stop reason: SDUPTHER

## 2020-07-16 RX ORDER — SOFT LENS DISINFECTANT
SOLUTION, NON-ORAL MISCELLANEOUS EVERY 6 HOURS PRN
Qty: 1 EACH | Refills: 0 | Status: SHIPPED | OUTPATIENT
Start: 2020-07-16 | End: 2020-07-21 | Stop reason: SDUPTHER

## 2020-07-16 NOTE — TELEPHONE ENCOUNTER
Please check, I did send the nebulizer RX but sometimes equipment RX'es print out at the printer up front even if I do have it set for normal  Also check [facility]  I am not sure that they do  If not, please give patient a call

## 2020-07-20 NOTE — TELEPHONE ENCOUNTER
I called the pharmacy and she stated that she is able to fill the solution but not the medical supplies  Pharmacist stated that would have to go to a medical supply company  Thank you!

## 2020-07-21 DIAGNOSIS — J45.41 MODERATE PERSISTENT ASTHMA WITH ACUTE EXACERBATION: ICD-10-CM

## 2020-07-21 RX ORDER — ALBUTEROL SULFATE 2.5 MG/3ML
2.5 SOLUTION RESPIRATORY (INHALATION) EVERY 6 HOURS PRN
Qty: 25 VIAL | Refills: 6 | Status: SHIPPED | OUTPATIENT
Start: 2020-07-21 | End: 2021-02-02

## 2020-07-21 RX ORDER — SOFT LENS DISINFECTANT
SOLUTION, NON-ORAL MISCELLANEOUS EVERY 6 HOURS PRN
Qty: 1 EACH | Refills: 0 | Status: SHIPPED | OUTPATIENT
Start: 2020-07-21 | End: 2021-10-05 | Stop reason: SDUPTHER

## 2020-07-21 NOTE — TELEPHONE ENCOUNTER
This can be sent to [facility]  [phone]  Fax: [phone]    Thank you!

## 2020-08-31 ENCOUNTER — TELEMEDICINE (OUTPATIENT)
Dept: FAMILY MEDICINE CLINIC | Facility: CLINIC | Age: 25
End: 2020-08-31
Payer: COMMERCIAL

## 2020-08-31 DIAGNOSIS — B00.1 COLD SORE: Primary | ICD-10-CM

## 2020-08-31 PROCEDURE — 99213 OFFICE O/P EST LOW 20 MIN: CPT | Performed by: FAMILY MEDICINE

## 2020-08-31 PROCEDURE — 1036F TOBACCO NON-USER: CPT | Performed by: FAMILY MEDICINE

## 2020-08-31 RX ORDER — ACYCLOVIR 50 MG/G
OINTMENT TOPICAL 4 TIMES DAILY
Qty: 15 G | Refills: 0 | Status: SHIPPED | OUTPATIENT
Start: 2020-08-31 | End: 2020-12-14 | Stop reason: ALTCHOICE

## 2020-08-31 NOTE — PROGRESS NOTES
Virtual Regular Visit      Assessment/Plan:    Problem List Items Addressed This Visit     None      Visit Diagnoses     Cold sore    -  Primary    Relevant Medications    acyclovir (ZOVIRAX) 5 % ointment               Reason for visit is   Chief Complaint   Patient presents with    Virtual Regular Visit        Encounter provider Coco Aburto DO    Provider located at [address] [place] [address]  [phone]      Recent Visits  No visits were found meeting these conditions  Showing recent visits within past 7 days and meeting all other requirements     Today's Visits  Date Type Provider Dept   08/31/20 Telemedicine Coco Aburto DO [facility]   Showing today's visits and meeting all other requirements     Future Appointments  No visits were found meeting these conditions  Showing future appointments within next 150 days and meeting all other requirements        The patient was identified by name and date of birth  Paxton Minor was informed that this is a telemedicine visit and that the visit is being conducted through [facility] and patient was informed that this is a secure, HIPAA-compliant platform  He agrees to proceed     My office door was closed  No one else was in the room  He acknowledged consent and understanding of privacy and security of the video platform  The patient has agreed to participate and understands they can discontinue the visit at any time  Patient is aware this is a billable service  Subjective  Paxton Minor is a 31 y o  male with a chief complaint of a cold sore on his lip  He has a history of prior ulcers in his mouth and herpes infections  He already takes valacyclovir in a suppressive dosage daily  [place] HPI     No past medical history on file      Past Surgical History:   Procedure Laterality Date    EAR SURGERY Left     TONSILECTOMY AND ADNOIDECTOMY      WRIST SURGERY Right Current Outpatient Medications   Medication Sig Dispense Refill    acyclovir (ZOVIRAX) 5 % ointment Apply topically 4 (four) times a day 15 g 0    albuterol (2 5 mg/3 mL) 0 083 % nebulizer solution Take 1 vial (2 5 mg total) by nebulization every 6 (six) hours as needed for wheezing or shortness of breath 25 vial 6    albuterol (PROAIR HFA) 90 mcg/act inhaler Inhale 2 puffs every 6 (six) hours as needed for wheezing 1 Inhaler 3    buPROPion (WELLBUTRIN XL) 150 mg 24 hr tablet Take 1 tablet (150 mg total) by mouth daily 90 tablet 3    buPROPion (WELLBUTRIN XL) 300 mg 24 hr tablet Take 1 tablet (300 mg total) by mouth daily 90 tablet 3    dicyclomine (BENTYL) 20 mg tablet Take 1 tablet (20 mg total) by mouth every 6 (six) hours As needed for diarrhea or abdominal cramping (Patient not taking: Reported on 1/27/2020) 30 tablet 0    fexofenadine-pseudoephedrine (ALLEGRA-D 24) 180-240 MG per 24 hr tablet Take 1 tablet by mouth daily 90 tablet 3    fluticasone (FLONASE) 50 mcg/act nasal spray 2 sprays into each nostril daily      fluticasone-vilanterol (BREO ELLIPTA) 100-25 mcg/inh inhaler Inhale 1 puff daily Rinse mouth after use  1 Inhaler 3    methylPREDNISolone 4 MG tablet therapy pack Use as directed on package 21 each 0    mometasone-formoterol (Dulera) 200-5 MCG/ACT inhaler Inhale 2 puffs daily 39 Inhaler 3    montelukast (SINGULAIR) 10 mg tablet Take 1 tablet (10 mg total) by mouth daily 90 tablet 3    Promethazine-DM (PHENERGAN-DM) 6 25-15 mg/5 mL oral syrup Take 5 mL by mouth every 6 (six) hours as needed for cough 118 mL 0    Respiratory Therapy Supplies (NEBULIZER) XIOMARA by Does not apply route every 6 (six) hours as needed (wheezing) Use albuterol in inhaler   1 each 0    tiZANidine (ZANAFLEX) 4 mg tablet TAKE 1/2 A TABLET EVERY 8 HOURS AS NEEDED  3    valACYclovir (VALTREX) 1,000 mg tablet Take 1 tablet (1,000 mg total) by mouth 2 (two) times a day 180 tablet 3     No current facility-administered medications for this visit  Allergies   Allergen Reactions    Cefprozil Hives    Penicillins Hives       Review of Systems   HENT:        Lip sore       Video Exam    There were no vitals filed for this visit  Physical Exam  Constitutional:       General: He is not in acute distress  Appearance: He is well-developed  HENT:      Head: Normocephalic and atraumatic  Mouth/Throat:      Comments: Fascicular lesion lower lip not well visualized on video  Eyes:      Pupils: Pupils are equal, round, and reactive to light  Neck:      Musculoskeletal: Normal range of motion  Pulmonary:      Effort: Pulmonary effort is normal  No respiratory distress  Breath sounds: Normal breath sounds  Skin:     Coloration: Skin is not pale  Neurological:      Mental Status: He is alert and oriented to person, place, and time  Psychiatric:         Behavior: Behavior normal          Thought Content: Thought content normal          Judgment: Judgment normal           I spent Ten minutes directly with the patient during this visit      [address] acknowledges that he has consented to an online visit or consultation  He understands that the online visit is based solely on information provided by him, and that, in the absence of a face-to-face physical evaluation by the physician, the diagnosis he receives is both limited and provisional in terms of accuracy and completeness  This is not intended to replace a full medical face-to-face evaluation by the physician  Tristen Dutta understands and accepts these terms

## 2020-09-30 ENCOUNTER — OFFICE VISIT (OUTPATIENT)
Dept: FAMILY MEDICINE CLINIC | Facility: CLINIC | Age: 25
End: 2020-09-30
Payer: COMMERCIAL

## 2020-09-30 VITALS
OXYGEN SATURATION: 96 % | HEIGHT: 72 IN | WEIGHT: 174 LBS | TEMPERATURE: 97.6 F | SYSTOLIC BLOOD PRESSURE: 120 MMHG | DIASTOLIC BLOOD PRESSURE: 70 MMHG | RESPIRATION RATE: 16 BRPM | BODY MASS INDEX: 23.57 KG/M2 | HEART RATE: 94 BPM

## 2020-09-30 DIAGNOSIS — L73.1 PSEUDOFOLLICULITIS BARBAE: Primary | ICD-10-CM

## 2020-09-30 PROCEDURE — 99213 OFFICE O/P EST LOW 20 MIN: CPT | Performed by: FAMILY MEDICINE

## 2020-09-30 PROCEDURE — 1036F TOBACCO NON-USER: CPT | Performed by: FAMILY MEDICINE

## 2020-09-30 RX ORDER — CLINDAMYCIN HYDROCHLORIDE 300 MG/1
CAPSULE ORAL
Qty: 30 CAPSULE | Refills: 0 | Status: SHIPPED | OUTPATIENT
Start: 2020-09-30 | End: 2020-10-10

## 2020-09-30 NOTE — PROGRESS NOTES
[facility]      NAME: Geovanny Pill  AGE: 31 y o  SEX: male  : 1995   MRN: [id number]    DATE: 2020  TIME: 2:43 PM    Assessment and Plan     Problem List Items Addressed This Visit     Pseudofolliculitis barbae - Primary     2 week hx of pimple on lower lip  In the past few days has gotten larger, and is somewhat tender upon palpation  Upon examination, patient exhibits 3 mm erythematous papule lower lip  Most likely pseudofolliculitis barbae  Advised patient not to shave daily  May also be exacerbated by mask wearing  Will give Rx for clindamycin 300 t i d  times 10 days ( patient is penicillin allergic)  Also recommend warm compresses  Call further problems           Relevant Medications    clindamycin (CLEOCIN) 300 MG capsule              Return to office in: call further probs  Chief Complaint     Chief Complaint   Patient presents with    Mouth Lesions       History of Present Illness     2 week hx of pimple on lower lip  In the past few days has gotten larger, and is somewhat tender upon palpation  The following portions of the patient's history were reviewed and updated as appropriate: allergies, current medications, past family history, past medical history, past social history, past surgical history and problem list     Review of Systems   Review of Systems   Respiratory: Negative  Cardiovascular: Negative  Gastrointestinal: Negative  Genitourinary: Negative  Skin: Positive for rash         Active Problem List     Patient Active Problem List   Diagnosis    Depression    Asthma    Allergic rhinitis    Esophagitis    Ganglion of right wrist    Herpes labialis    Irritable bowel syndrome    Vitamin D deficiency    Annual physical exam    Pseudofolliculitis barbae       Objective   /70 (BP Location: Left arm, Patient Position: Sitting, Cuff Size: Adult)   Pulse 94   Temp 97 6 °F (36 4 °C) (Tympanic)   Resp 16   Ht 6' 0 44" (1 84 m)   Wt 78 9 kg (174 lb)   SpO2 96%   BMI 23 31 kg/m²     Physical Exam  HENT:      Head:      Comments: 3 mm papule lower lip  Pertinent Laboratory/Diagnostic Studies:  none    Current Medications     Current Outpatient Medications:     albuterol (2 5 mg/3 mL) 0 083 % nebulizer solution, Take 1 vial (2 5 mg total) by nebulization every 6 (six) hours as needed for wheezing or shortness of breath, Disp: 25 vial, Rfl: 6    albuterol (PROAIR HFA) 90 mcg/act inhaler, Inhale 2 puffs every 6 (six) hours as needed for wheezing, Disp: 1 Inhaler, Rfl: 3    buPROPion (WELLBUTRIN XL) 150 mg 24 hr tablet, Take 1 tablet (150 mg total) by mouth daily, Disp: 90 tablet, Rfl: 3    buPROPion (WELLBUTRIN XL) 300 mg 24 hr tablet, Take 1 tablet (300 mg total) by mouth daily, Disp: 90 tablet, Rfl: 3    fexofenadine-pseudoephedrine (ALLEGRA-D 24) 180-240 MG per 24 hr tablet, Take 1 tablet by mouth daily, Disp: 90 tablet, Rfl: 3    fluticasone (FLONASE) 50 mcg/act nasal spray, 2 sprays into each nostril daily, Disp: , Rfl:     mometasone-formoterol (Dulera) 200-5 MCG/ACT inhaler, Inhale 2 puffs daily, Disp: 39 Inhaler, Rfl: 3    montelukast (SINGULAIR) 10 mg tablet, Take 1 tablet (10 mg total) by mouth daily, Disp: 90 tablet, Rfl: 3    Respiratory Therapy Supplies (NEBULIZER) XIOMARA, by Does not apply route every 6 (six) hours as needed (wheezing) Use albuterol in inhaler  , Disp: 1 each, Rfl: 0    tiZANidine (ZANAFLEX) 4 mg tablet, TAKE 1/2 A TABLET EVERY 8 HOURS AS NEEDED, Disp: , Rfl: 3    valACYclovir (VALTREX) 1,000 mg tablet, Take 1 tablet (1,000 mg total) by mouth 2 (two) times a day, Disp: 180 tablet, Rfl: 3    acyclovir (ZOVIRAX) 5 % ointment, Apply topically 4 (four) times a day (Patient not taking: Reported on 9/30/2020), Disp: 15 g, Rfl: 0    clindamycin (CLEOCIN) 300 MG capsule, 1 TID X 10 days, Disp: 30 capsule, Rfl: 0    dicyclomine (BENTYL) 20 mg tablet, Take 1 tablet (20 mg total) by mouth every 6 (six) hours As needed for diarrhea or abdominal cramping (Patient not taking: Reported on 1/27/2020), Disp: 30 tablet, Rfl: 0    fluticasone-vilanterol (BREO ELLIPTA) 100-25 mcg/inh inhaler, Inhale 1 puff daily Rinse mouth after use   (Patient not taking: Reported on 9/30/2020), Disp: 1 Inhaler, Rfl: 3    methylPREDNISolone 4 MG tablet therapy pack, Use as directed on package (Patient not taking: Reported on 9/30/2020), Disp: 21 each, Rfl: 0    Promethazine-DM (PHENERGAN-DM) 6 25-15 mg/5 mL oral syrup, Take 5 mL by mouth every 6 (six) hours as needed for cough (Patient not taking: Reported on 9/30/2020), Disp: 118 mL, Rfl: 0    Health Maintenance     Health Maintenance   Topic Date Due    Pneumococcal Vaccine: Pediatrics (0 to 5 Years) and At-Risk Patients (6 to 59 Years) (1 of 1 - PPSV23) 01/08/2000    HPV Vaccine (1 - Male 2-dose series) 01/08/2005    HIV Screening  01/08/2009    Influenza Vaccine  07/01/2020    Annual Physical  10/16/2020    BMI: Adult  09/30/2021    DTaP,Tdap,and Td Vaccines (3 - Td) 03/07/2029    HIB Vaccine  Aged Out    Hepatitis B Vaccine  Aged Out    IPV Vaccine  Aged Out    Hepatitis A Vaccine  Aged Out    Meningococcal ACWY Vaccine  Aged Out     Immunization History   Administered Date(s) Administered    INFLUENZA 11/26/2014, 11/01/2016    Influenza Quadrivalent Preservative Free 3 years and older IM 11/01/2016    Influenza Quadrivalent, 6-35 Months IM 12/22/2015    Influenza TIV (IM) 11/26/2014, 10/26/2016    Influenza, injectable, quadrivalent, preservative free 0 5 mL 10/16/2019    Tdap 04/05/2006, 03/07/2019       Arnold Mcardle, DO  [facility]

## 2020-09-30 NOTE — ASSESSMENT & PLAN NOTE
2 week hx of pimple on lower lip  In the past few days has gotten larger, and is somewhat tender upon palpation  Upon examination, patient exhibits 3 mm erythematous papule lower lip  Most likely pseudofolliculitis ulices  Advised patient not to shave daily  May also be exacerbated by mask wearing  Will give Rx for clindamycin 300 t i d  times 10 days ( patient is penicillin allergic)  Also recommend warm compresses    Call further problems

## 2020-10-06 DIAGNOSIS — F32.A DEPRESSION, UNSPECIFIED DEPRESSION TYPE: ICD-10-CM

## 2020-10-06 DIAGNOSIS — B00.9 HERPES INFECTION: ICD-10-CM

## 2020-10-06 RX ORDER — VALACYCLOVIR HYDROCHLORIDE 1 G/1
TABLET, FILM COATED ORAL
Qty: 180 TABLET | Refills: 1 | Status: SHIPPED | OUTPATIENT
Start: 2020-10-06 | End: 2021-04-04

## 2020-10-06 RX ORDER — BUPROPION HYDROCHLORIDE 300 MG/1
TABLET ORAL
Qty: 90 TABLET | Refills: 1 | Status: SHIPPED | OUTPATIENT
Start: 2020-10-06 | End: 2020-12-14 | Stop reason: SDUPTHER

## 2020-11-02 DIAGNOSIS — J30.9 ALLERGIC RHINITIS, UNSPECIFIED SEASONALITY, UNSPECIFIED TRIGGER: ICD-10-CM

## 2020-11-02 RX ORDER — FEXOFENADINE HCL AND PSEUDOEPHEDRINE HCI 180; 240 MG/1; MG/1
TABLET, EXTENDED RELEASE ORAL
Qty: 30 TABLET | Refills: 2 | Status: SHIPPED | OUTPATIENT
Start: 2020-11-02 | End: 2020-12-14 | Stop reason: SDUPTHER

## 2020-12-14 ENCOUNTER — OFFICE VISIT (OUTPATIENT)
Dept: FAMILY MEDICINE CLINIC | Facility: CLINIC | Age: 25
End: 2020-12-14
Payer: COMMERCIAL

## 2020-12-14 VITALS
SYSTOLIC BLOOD PRESSURE: 102 MMHG | HEART RATE: 98 BPM | DIASTOLIC BLOOD PRESSURE: 70 MMHG | WEIGHT: 173.4 LBS | BODY MASS INDEX: 23.49 KG/M2 | TEMPERATURE: 98 F | RESPIRATION RATE: 16 BRPM | HEIGHT: 72 IN | OXYGEN SATURATION: 98 %

## 2020-12-14 DIAGNOSIS — J30.9 ALLERGIC RHINITIS, UNSPECIFIED SEASONALITY, UNSPECIFIED TRIGGER: ICD-10-CM

## 2020-12-14 DIAGNOSIS — J45.41 MODERATE PERSISTENT ASTHMA WITH ACUTE EXACERBATION: ICD-10-CM

## 2020-12-14 DIAGNOSIS — Z13.6 SCREENING FOR CARDIOVASCULAR CONDITION: ICD-10-CM

## 2020-12-14 DIAGNOSIS — Z13.1 SCREENING FOR DIABETES MELLITUS: ICD-10-CM

## 2020-12-14 DIAGNOSIS — J45.41 MODERATE PERSISTENT ASTHMA WITH ACUTE EXACERBATION: Primary | ICD-10-CM

## 2020-12-14 DIAGNOSIS — L30.9 DERMATITIS: ICD-10-CM

## 2020-12-14 DIAGNOSIS — F32.A DEPRESSION, UNSPECIFIED DEPRESSION TYPE: ICD-10-CM

## 2020-12-14 DIAGNOSIS — J45.909 UNCOMPLICATED ASTHMA, UNSPECIFIED ASTHMA SEVERITY, UNSPECIFIED WHETHER PERSISTENT: ICD-10-CM

## 2020-12-14 DIAGNOSIS — Z00.00 ANNUAL PHYSICAL EXAM: Primary | ICD-10-CM

## 2020-12-14 DIAGNOSIS — Z23 NEED FOR IMMUNIZATION AGAINST INFLUENZA: ICD-10-CM

## 2020-12-14 PROCEDURE — 99395 PREV VISIT EST AGE 18-39: CPT | Performed by: FAMILY MEDICINE

## 2020-12-14 PROCEDURE — 90471 IMMUNIZATION ADMIN: CPT | Performed by: FAMILY MEDICINE

## 2020-12-14 PROCEDURE — 90682 RIV4 VACC RECOMBINANT DNA IM: CPT | Performed by: FAMILY MEDICINE

## 2020-12-14 RX ORDER — BUDESONIDE AND FORMOTEROL FUMARATE DIHYDRATE 160; 4.5 UG/1; UG/1
2 AEROSOL RESPIRATORY (INHALATION) 2 TIMES DAILY
Qty: 3 INHALER | Refills: 3 | Status: SHIPPED | OUTPATIENT
Start: 2020-12-14 | End: 2020-12-23 | Stop reason: CLARIF

## 2020-12-14 RX ORDER — MONTELUKAST SODIUM 10 MG/1
10 TABLET ORAL DAILY
Qty: 90 TABLET | Refills: 3 | Status: SHIPPED | OUTPATIENT
Start: 2020-12-14 | End: 2021-10-12 | Stop reason: SDUPTHER

## 2020-12-14 RX ORDER — BUPROPION HYDROCHLORIDE 300 MG/1
300 TABLET ORAL DAILY
Qty: 90 TABLET | Refills: 1 | Status: SHIPPED | OUTPATIENT
Start: 2020-12-14 | End: 2021-06-18

## 2020-12-14 RX ORDER — MOMETASONE FUROATE AND FORMOTEROL FUMARATE DIHYDRATE 200; 5 UG/1; UG/1
AEROSOL RESPIRATORY (INHALATION)
Qty: 39 INHALER | Refills: 3 | OUTPATIENT
Start: 2020-12-14

## 2020-12-14 RX ORDER — FEXOFENADINE HCL AND PSEUDOEPHEDRINE HCI 180; 240 MG/1; MG/1
1 TABLET, EXTENDED RELEASE ORAL DAILY
Qty: 90 TABLET | Refills: 3 | Status: SHIPPED | OUTPATIENT
Start: 2020-12-14 | End: 2021-02-03 | Stop reason: SDUPTHER

## 2020-12-14 RX ORDER — MOMETASONE FUROATE AND FORMOTEROL FUMARATE DIHYDRATE 200; 5 UG/1; UG/1
2 AEROSOL RESPIRATORY (INHALATION) DAILY
Qty: 39 INHALER | Refills: 3 | Status: SHIPPED | OUTPATIENT
Start: 2020-12-14 | End: 2020-12-14 | Stop reason: CLARIF

## 2020-12-14 RX ORDER — ALBUTEROL SULFATE 90 UG/1
2 AEROSOL, METERED RESPIRATORY (INHALATION) EVERY 6 HOURS PRN
Qty: 1 INHALER | Refills: 3 | Status: SHIPPED | OUTPATIENT
Start: 2020-12-14 | End: 2022-05-12 | Stop reason: SDUPTHER

## 2020-12-14 RX ORDER — CLOTRIMAZOLE AND BETAMETHASONE DIPROPIONATE 10; .64 MG/G; MG/G
CREAM TOPICAL
Qty: 30 G | Refills: 1 | Status: SHIPPED | OUTPATIENT
Start: 2020-12-14 | End: 2022-05-05 | Stop reason: SDUPTHER

## 2020-12-14 RX ORDER — BUPROPION HYDROCHLORIDE 150 MG/1
150 TABLET ORAL DAILY
Qty: 90 TABLET | Refills: 3 | Status: SHIPPED | OUTPATIENT
Start: 2020-12-14 | End: 2021-12-03

## 2020-12-14 NOTE — TELEPHONE ENCOUNTER
Call patient  Pharmacy says that Lucille Rivas is not covered by his insurance  Has he had Symbicort, Advair or Breo in the past?  I will have to send one of those instead

## 2020-12-14 NOTE — PROGRESS NOTES
ADULT ANNUAL [address]  GROUP    NAME: Kelly Whelan  AGE: 31 y o  SEX: male  : 1995     DATE: 2021     Assessment and Plan:     Patient is 50-year-old male seen for well adult exam     His current medical problems include asthma and allergies  He is also prescribed medication for depression  Problem List Items Addressed This Visit        Respiratory    Asthma    Relevant Medications    albuterol (ProAir HFA) 90 mcg/act inhaler    montelukast (SINGULAIR) 10 mg tablet    Allergic rhinitis    Relevant Medications    montelukast (SINGULAIR) 10 mg tablet    fexofenadine-pseudoephedrine (ALLEGRA-D 24) 180-240 MG per 24 hr tablet       Other    Depression    Relevant Medications    buPROPion (WELLBUTRIN XL) 150 mg 24 hr tablet    buPROPion (WELLBUTRIN XL) 300 mg 24 hr tablet    Other Relevant Orders    Comprehensive metabolic panel    TSH, 3rd generation with Free T4 reflex    CBC and differential    Annual physical exam - Primary      Other Visit Diagnoses     Dermatitis        Relevant Medications    clotrimazole-betamethasone (LOTRISONE) 1-0 05 % cream    Screening for cardiovascular condition        Relevant Orders    Lipid Panel with Direct LDL reflex    Screening for diabetes mellitus        Relevant Orders    Comprehensive metabolic panel    Hemoglobin A1C With EAG    Need for immunization against influenza        Relevant Orders    influenza vaccine, quadrivalent, recombinant, PF, 0 5 mL, for patients 18 yr+ (FLUBLOK) (Completed)          I did give him blood work orders to screen for his cholesterol and blood sugar also check a CBC because of feeling run down  Immunizations and preventive care screenings were discussed with patient today  Appropriate education was printed on patient's after visit summary      Counseling:  Alcohol/drug use: discussed moderation in alcohol intake, the recommendations for healthy alcohol use, and avoidance of illicit drug use  Dental Health: discussed importance of regular tooth brushing, flossing, and dental visits  · Exercise: the importance of regular exercise/physical activity was discussed  Recommend exercise 3-5 times per week for at least 30 minutes  No follow-ups on file  Chief Complaint:     Chief Complaint   Patient presents with    Physical Exam     Annual      History of Present Illness:     Adult Annual Physical   Patient here for a comprehensive physical exam  The patient reports problems -    Feeling run down  Concerned that he may have long term symptoms  He is using his nebulizer daily  Had a patch  Diet and Physical Activity  · Diet/Nutrition: well balanced diet  · Exercise: moderate cardiovascular exercise  Depression Screening  PHQ-9 Depression Screening    PHQ-9:   Frequency of the following problems over the past two weeks:      Little interest or pleasure in doing things: 0 - not at all  Feeling down, depressed, or hopeless: 0 - not at all  Trouble falling or staying asleep, or sleeping too much: 0 - not at all  Feeling tired or having little energy: 0 - not at all  Poor appetite or overeatin - not at all  Feeling bad about yourself - or that you are a failure or have let yourself or your family down: 0 - not at all  Trouble concentrating on things, such as reading the newspaper or watching television: 0 - not at all  Moving or speaking so slowly that other people could have noticed  Or the opposite - being so fidgety or restless that you have been moving around a lot more than usual: 0 - not at all  Thoughts that you would be better off dead, or of hurting yourself in some way: 0 - not at all  PHQ-2 Score: 0  PHQ-9 Score: 0       General Health  · Sleep: sleeps well  · Hearing: normal - bilateral   · Vision:      · Dental: regular dental visits          Health  · History of STDs?: no      Review of Systems:     Review of Systems   Constitutional: Negative for chills and fever  HENT: Negative for congestion and sore throat  Respiratory: Negative for chest tightness  Cardiovascular: Negative for chest pain and palpitations  Gastrointestinal: Negative for abdominal pain, constipation, diarrhea and nausea  Genitourinary: Negative for difficulty urinating  Skin: Negative  Neurological: Negative for dizziness and headaches  Psychiatric/Behavioral: Negative  Past Medical History:     History reviewed  No pertinent past medical history     Past Surgical History:     Past Surgical History:   Procedure Laterality Date    EAR SURGERY Left     TONSILECTOMY AND ADNOIDECTOMY      WRIST SURGERY Right       Social History:     E-Cigarette/Vaping    E-Cigarette Use Never User      E-Cigarette/Vaping Substances    Nicotine No     THC No     CBD No     Flavoring No     Other No     Unknown No      Social History     Socioeconomic History    Marital status: Single     Spouse name: None    Number of children: None    Years of education: None    Highest education level: None   Occupational History    None   Social Needs    Financial resource strain: None    Food insecurity     Worry: None     Inability: None    Transportation needs     Medical: None     Non-medical: None   Tobacco Use    Smoking status: Never Smoker    Smokeless tobacco: Never Used   Substance and Sexual Activity    Alcohol use: Yes     Frequency: 2-4 times a month     Drinks per session: 3 or 4     Binge frequency: Never     Comment: social    Drug use: Never    Sexual activity: Yes   Lifestyle    Physical activity     Days per week: None     Minutes per session: None    Stress: None   Relationships    Social connections     Talks on phone: None     Gets together: None     Attends Worship service: None     Active member of club or organization: None     Attends meetings of clubs or organizations: None     Relationship status: None    Intimate partner violence     Fear of current or ex partner: None     Emotionally abused: None     Physically abused: None     Forced sexual activity: None   Other Topics Concern    None   Social History Narrative    None      Family History:     Family History   Problem Relation Age of Onset    No Known Problems Mother     Diabetes Father     [place]'s disease Father     Substance Abuse Neg Hx     Alcohol abuse Neg Hx       Current Medications:     Current Outpatient Medications   Medication Sig Dispense Refill    albuterol (2 5 mg/3 mL) 0 083 % nebulizer solution Take 1 vial (2 5 mg total) by nebulization every 6 (six) hours as needed for wheezing or shortness of breath 25 vial 6    albuterol (ProAir HFA) 90 mcg/act inhaler Inhale 2 puffs every 6 (six) hours as needed for wheezing 1 Inhaler 3    buPROPion (WELLBUTRIN XL) 150 mg 24 hr tablet Take 1 tablet (150 mg total) by mouth daily 90 tablet 3    buPROPion (WELLBUTRIN XL) 300 mg 24 hr tablet Take 1 tablet (300 mg total) by mouth daily 90 tablet 1    fexofenadine-pseudoephedrine (ALLEGRA-D 24) 180-240 MG per 24 hr tablet Take 1 tablet by mouth daily 90 tablet 3    fluticasone (FLONASE) 50 mcg/act nasal spray 2 sprays into each nostril daily      montelukast (SINGULAIR) 10 mg tablet Take 1 tablet (10 mg total) by mouth daily 90 tablet 3    Respiratory Therapy Supplies (NEBULIZER) XIOMARA by Does not apply route every 6 (six) hours as needed (wheezing) Use albuterol in inhaler  1 each 0    tiZANidine (ZANAFLEX) 4 mg tablet TAKE 1/2 A TABLET EVERY 8 HOURS AS NEEDED  3    valACYclovir (VALTREX) 1,000 mg tablet TAKE 1 TABLET BY MOUTH TWICE A  tablet 1    clotrimazole-betamethasone (LOTRISONE) 1-0 05 % cream Apply twice daily to upper arm as needed  30 g 1    fluticasone-vilanterol (BREO ELLIPTA) 200-25 MCG/INH inhaler Inhale 1 puff daily Rinse mouth after use  3 Inhaler 3     No current facility-administered medications for this visit  Allergies:      Allergies   Allergen Reactions    Cefprozil Hives    Penicillins Hives      Physical Exam:     /70 (BP Location: Left arm, Patient Position: Sitting, Cuff Size: Standard)   Pulse 98   Temp 98 °F (36 7 °C) (Tympanic)   Resp 16   Ht 6' 0 44" (1 84 m)   Wt 78 7 kg (173 lb 6 4 oz)   SpO2 98%   BMI 23 23 kg/m²     Physical Exam  Constitutional:       General: He is not in acute distress  Appearance: He is well-developed  HENT:      Right Ear: Tympanic membrane normal       Left Ear: Tympanic membrane normal       Mouth/Throat:      Pharynx: No oropharyngeal exudate  Neck:      Thyroid: No thyromegaly  Cardiovascular:      Rate and Rhythm: Normal rate and regular rhythm  Heart sounds: Normal heart sounds  Pulmonary:      Effort: Pulmonary effort is normal       Breath sounds: Normal breath sounds  Abdominal:      General: Bowel sounds are normal       Palpations: Abdomen is soft  Lymphadenopathy:      Cervical: No cervical adenopathy  Skin:     General: Skin is warm and dry  Neurological:      Mental Status: He is alert and oriented to person, place, and time     Psychiatric:         Mood and Affect: Mood normal           Charline Infante DO   [address]

## 2020-12-14 NOTE — PATIENT INSTRUCTIONS

## 2020-12-16 LAB
ALBUMIN SERPL-MCNC: 4.6 G/DL (ref 3.6–5.1)
ALBUMIN/GLOB SERPL: 2.3 (CALC) (ref 1–2.5)
ALP SERPL-CCNC: 45 U/L (ref 36–130)
ALT SERPL-CCNC: 23 U/L (ref 9–46)
AST SERPL-CCNC: 16 U/L (ref 10–40)
BASOPHILS # BLD AUTO: 50 CELLS/UL (ref 0–200)
BASOPHILS NFR BLD AUTO: 0.9 %
BILIRUB SERPL-MCNC: 0.5 MG/DL (ref 0.2–1.2)
BUN SERPL-MCNC: 13 MG/DL (ref 7–25)
BUN/CREAT SERPL: NORMAL (CALC) (ref 6–22)
CALCIUM SERPL-MCNC: 9.8 MG/DL (ref 8.6–10.3)
CHLORIDE SERPL-SCNC: 102 MMOL/L (ref 98–110)
CHOLEST SERPL-MCNC: 159 MG/DL
CHOLEST/HDLC SERPL: 2.7 (CALC)
CO2 SERPL-SCNC: 29 MMOL/L (ref 20–32)
CREAT SERPL-MCNC: 1.03 MG/DL (ref 0.6–1.35)
EOSINOPHIL # BLD AUTO: 319 CELLS/UL (ref 15–500)
EOSINOPHIL NFR BLD AUTO: 5.7 %
ERYTHROCYTE [DISTWIDTH] IN BLOOD BY AUTOMATED COUNT: 11.9 % (ref 11–15)
EST. AVERAGE GLUCOSE BLD GHB EST-MCNC: 94 (CALC)
EST. AVERAGE GLUCOSE BLD GHB EST-SCNC: 5.2 (CALC)
GLOBULIN SER CALC-MCNC: 2 G/DL (CALC) (ref 1.9–3.7)
GLUCOSE SERPL-MCNC: 74 MG/DL (ref 65–139)
HBA1C MFR BLD: 4.9 % OF TOTAL HGB
HCT VFR BLD AUTO: 45.3 % (ref 38.5–50)
HDLC SERPL-MCNC: 59 MG/DL
HGB BLD-MCNC: 15.2 G/DL (ref 13.2–17.1)
LDLC SERPL CALC-MCNC: 79 MG/DL (CALC)
LYMPHOCYTES # BLD AUTO: 1131 CELLS/UL (ref 850–3900)
LYMPHOCYTES NFR BLD AUTO: 20.2 %
MCH RBC QN AUTO: 32.5 PG (ref 27–33)
MCHC RBC AUTO-ENTMCNC: 33.6 G/DL (ref 32–36)
MCV RBC AUTO: 96.8 FL (ref 80–100)
MONOCYTES # BLD AUTO: 644 CELLS/UL (ref 200–950)
MONOCYTES NFR BLD AUTO: 11.5 %
NEUTROPHILS # BLD AUTO: 3455 CELLS/UL (ref 1500–7800)
NEUTROPHILS NFR BLD AUTO: 61.7 %
NONHDLC SERPL-MCNC: 100 MG/DL (CALC)
PLATELET # BLD AUTO: 229 THOUSAND/UL (ref 140–400)
PMV BLD REES-ECKER: 11.8 FL (ref 7.5–12.5)
POTASSIUM SERPL-SCNC: 4.6 MMOL/L (ref 3.5–5.3)
PROT SERPL-MCNC: 6.6 G/DL (ref 6.1–8.1)
RBC # BLD AUTO: 4.68 MILLION/UL (ref 4.2–5.8)
SARS-COV-2 IGG SERPL QL IA: NEGATIVE
SL AMB EGFR AFRICAN AMERICAN: 116 ML/MIN/1.73M2
SL AMB EGFR NON AFRICAN AMERICAN: 100 ML/MIN/1.73M2
SODIUM SERPL-SCNC: 140 MMOL/L (ref 135–146)
TRIGL SERPL-MCNC: 116 MG/DL
TSH SERPL-ACNC: 0.92 MIU/L (ref 0.4–4.5)
WBC # BLD AUTO: 5.6 THOUSAND/UL (ref 3.8–10.8)

## 2020-12-23 DIAGNOSIS — J45.41 MODERATE PERSISTENT ASTHMA WITH ACUTE EXACERBATION: Primary | ICD-10-CM

## 2020-12-23 RX ORDER — FLUTICASONE FUROATE AND VILANTEROL 200; 25 UG/1; UG/1
1 POWDER RESPIRATORY (INHALATION) DAILY
Qty: 3 INHALER | Refills: 3 | Status: SHIPPED | OUTPATIENT
Start: 2020-12-23 | End: 2021-03-02 | Stop reason: ALTCHOICE

## 2020-12-23 NOTE — TELEPHONE ENCOUNTER
Patient called and stated his insurance does not cover the symbicort, asking if you could try sending the [place] to his pharmacy

## 2021-02-02 DIAGNOSIS — J45.41 MODERATE PERSISTENT ASTHMA WITH ACUTE EXACERBATION: ICD-10-CM

## 2021-02-02 RX ORDER — ALBUTEROL SULFATE 2.5 MG/3ML
SOLUTION RESPIRATORY (INHALATION)
Qty: 75 ML | Refills: 5 | Status: SHIPPED | OUTPATIENT
Start: 2021-02-02

## 2021-02-03 DIAGNOSIS — J30.9 ALLERGIC RHINITIS, UNSPECIFIED SEASONALITY, UNSPECIFIED TRIGGER: ICD-10-CM

## 2021-02-03 DIAGNOSIS — J45.909 UNCOMPLICATED ASTHMA, UNSPECIFIED ASTHMA SEVERITY, UNSPECIFIED WHETHER PERSISTENT: ICD-10-CM

## 2021-02-03 RX ORDER — ALBUTEROL SULFATE 90 UG/1
2 AEROSOL, METERED RESPIRATORY (INHALATION) EVERY 6 HOURS PRN
Qty: 1 INHALER | Refills: 3 | Status: CANCELLED | OUTPATIENT
Start: 2021-02-03

## 2021-02-03 RX ORDER — FEXOFENADINE HCL AND PSEUDOEPHEDRINE HCI 180; 240 MG/1; MG/1
1 TABLET, EXTENDED RELEASE ORAL DAILY
Qty: 90 TABLET | Refills: 3 | Status: SHIPPED | OUTPATIENT
Start: 2021-02-03 | End: 2022-02-17

## 2021-02-03 NOTE — TELEPHONE ENCOUNTER
Patient says that for some reason his Allegra D rx was cancelled  He would like a refill and would like #90 with 4 refills! He is also out of his albuterol  And would like that refilled, with refills for a year      [facility]

## 2021-03-01 DIAGNOSIS — J45.20 MILD INTERMITTENT ASTHMA WITHOUT COMPLICATION: Primary | ICD-10-CM

## 2021-03-01 NOTE — TELEPHONE ENCOUNTER
Pt called with questions, requesting a nurse about medication change that his specialist recommended       Please contact patient [phone]    Thank you     [facility]   [phone]

## 2021-03-02 DIAGNOSIS — J45.20 MILD INTERMITTENT ASTHMA WITHOUT COMPLICATION: ICD-10-CM

## 2021-03-02 RX ORDER — MOMETASONE FUROATE AND FORMOTEROL FUMARATE DIHYDRATE 200; 5 UG/1; UG/1
AEROSOL RESPIRATORY (INHALATION)
Qty: 3 INHALER | Refills: 0 | Status: SHIPPED | OUTPATIENT
Start: 2021-03-02 | End: 2021-10-12 | Stop reason: SDUPTHER

## 2021-03-02 NOTE — TELEPHONE ENCOUNTER
Pt called back stating he saw specialist at [address] and they stated they would rather have Pt be on Dulera than Symbicort  Pt states he originally went on Symbicort because insurance wouldn't cover [facility]  Pt states he would rather pay out of pocket until his deductible is met

## 2021-04-04 DIAGNOSIS — B00.9 HERPES INFECTION: ICD-10-CM

## 2021-04-04 RX ORDER — VALACYCLOVIR HYDROCHLORIDE 1 G/1
TABLET, FILM COATED ORAL
Qty: 180 TABLET | Refills: 1 | Status: SHIPPED | OUTPATIENT
Start: 2021-04-04 | End: 2021-07-28

## 2021-05-05 ENCOUNTER — TELEPHONE (OUTPATIENT)
Dept: FAMILY MEDICINE CLINIC | Facility: CLINIC | Age: 26
End: 2021-05-05

## 2021-05-05 NOTE — TELEPHONE ENCOUNTER
Pt called requesting a covid antibody test  He is insistent on having this done  He wants it for his own piece of mind and doesn't care of its covered or not  Call with any questions [phone]    Pt uses [facility]   He can  the orders

## 2021-05-10 DIAGNOSIS — Z20.822 EXPOSURE TO COVID-19 VIRUS: Primary | ICD-10-CM

## 2021-06-17 DIAGNOSIS — F32.A DEPRESSION, UNSPECIFIED DEPRESSION TYPE: ICD-10-CM

## 2021-06-18 RX ORDER — BUPROPION HYDROCHLORIDE 300 MG/1
TABLET ORAL
Qty: 90 TABLET | Refills: 1 | Status: SHIPPED | OUTPATIENT
Start: 2021-06-18 | End: 2021-12-13

## 2021-07-28 DIAGNOSIS — B00.9 HERPES INFECTION: ICD-10-CM

## 2021-07-28 RX ORDER — VALACYCLOVIR HYDROCHLORIDE 1 G/1
TABLET, FILM COATED ORAL
Qty: 180 TABLET | Refills: 1 | Status: SHIPPED | OUTPATIENT
Start: 2021-07-28 | End: 2022-01-24

## 2021-08-03 LAB — SARS-COV-2 IGG SERPL IA-ACNC: >20 INDEX

## 2021-09-02 ENCOUNTER — TELEPHONE (OUTPATIENT)
Dept: FAMILY MEDICINE CLINIC | Facility: CLINIC | Age: 26
End: 2021-09-02

## 2021-09-02 NOTE — TELEPHONE ENCOUNTER
Pt is asking for another portable neubelizer because his isn't working properly       Pls let him know where you send it [phone]

## 2021-10-04 ENCOUNTER — TELEPHONE (OUTPATIENT)
Dept: FAMILY MEDICINE CLINIC | Facility: CLINIC | Age: 26
End: 2021-10-04

## 2021-10-12 ENCOUNTER — OFFICE VISIT (OUTPATIENT)
Dept: FAMILY MEDICINE CLINIC | Facility: CLINIC | Age: 26
End: 2021-10-12
Payer: COMMERCIAL

## 2021-10-12 VITALS
DIASTOLIC BLOOD PRESSURE: 62 MMHG | HEIGHT: 72 IN | SYSTOLIC BLOOD PRESSURE: 108 MMHG | OXYGEN SATURATION: 99 % | WEIGHT: 171.2 LBS | HEART RATE: 81 BPM | TEMPERATURE: 97.6 F | BODY MASS INDEX: 23.19 KG/M2

## 2021-10-12 DIAGNOSIS — Z00.00 ANNUAL PHYSICAL EXAM: Primary | ICD-10-CM

## 2021-10-12 DIAGNOSIS — Z11.4 SCREENING FOR HIV (HUMAN IMMUNODEFICIENCY VIRUS): ICD-10-CM

## 2021-10-12 DIAGNOSIS — F32.A DEPRESSION, UNSPECIFIED DEPRESSION TYPE: ICD-10-CM

## 2021-10-12 DIAGNOSIS — J30.9 ALLERGIC RHINITIS, UNSPECIFIED SEASONALITY, UNSPECIFIED TRIGGER: ICD-10-CM

## 2021-10-12 DIAGNOSIS — Z13.29 SCREENING FOR THYROID DISORDER: ICD-10-CM

## 2021-10-12 DIAGNOSIS — Z11.3 SCREEN FOR STD (SEXUALLY TRANSMITTED DISEASE): ICD-10-CM

## 2021-10-12 DIAGNOSIS — Z13.0 SCREENING FOR DEFICIENCY ANEMIA: ICD-10-CM

## 2021-10-12 DIAGNOSIS — Z11.59 ENCOUNTER FOR HEPATITIS C SCREENING TEST FOR LOW RISK PATIENT: ICD-10-CM

## 2021-10-12 DIAGNOSIS — J45.20 MILD INTERMITTENT ASTHMA WITHOUT COMPLICATION: ICD-10-CM

## 2021-10-12 DIAGNOSIS — Z11.9 ENCOUNTER FOR SCREENING FOR INFECTIOUS AND PARASITIC DISEASES, UNSPECIFIED: ICD-10-CM

## 2021-10-12 DIAGNOSIS — Z13.1 SCREENING FOR DIABETES MELLITUS: ICD-10-CM

## 2021-10-12 DIAGNOSIS — Z13.6 SCREENING FOR CARDIOVASCULAR CONDITION: ICD-10-CM

## 2021-10-12 DIAGNOSIS — Z23 NEED FOR VACCINATION: ICD-10-CM

## 2021-10-12 PROCEDURE — 90682 RIV4 VACC RECOMBINANT DNA IM: CPT | Performed by: FAMILY MEDICINE

## 2021-10-12 PROCEDURE — 90471 IMMUNIZATION ADMIN: CPT | Performed by: FAMILY MEDICINE

## 2021-10-12 PROCEDURE — 99395 PREV VISIT EST AGE 18-39: CPT | Performed by: FAMILY MEDICINE

## 2021-10-12 PROCEDURE — 1036F TOBACCO NON-USER: CPT | Performed by: FAMILY MEDICINE

## 2021-10-12 PROCEDURE — 3008F BODY MASS INDEX DOCD: CPT | Performed by: FAMILY MEDICINE

## 2021-10-12 RX ORDER — MOMETASONE FUROATE AND FORMOTEROL FUMARATE DIHYDRATE 200; 5 UG/1; UG/1
2 AEROSOL RESPIRATORY (INHALATION) 2 TIMES DAILY
Qty: 39 G | Refills: 3 | Status: SHIPPED | OUTPATIENT
Start: 2021-10-12

## 2021-10-12 RX ORDER — MONTELUKAST SODIUM 10 MG/1
10 TABLET ORAL DAILY
Qty: 90 TABLET | Refills: 3 | Status: SHIPPED | OUTPATIENT
Start: 2021-10-12

## 2021-12-03 DIAGNOSIS — F32.A DEPRESSION, UNSPECIFIED DEPRESSION TYPE: ICD-10-CM

## 2021-12-03 LAB — SARS-COV-2 IGG SERPL IA-ACNC: >150 INDEX

## 2021-12-03 RX ORDER — BUPROPION HYDROCHLORIDE 150 MG/1
TABLET ORAL
Qty: 90 TABLET | Refills: 3 | Status: SHIPPED | OUTPATIENT
Start: 2021-12-03 | End: 2022-03-30 | Stop reason: ALTCHOICE

## 2021-12-07 LAB
ALBUMIN SERPL-MCNC: 5 G/DL (ref 3.6–5.1)
ALBUMIN/GLOB SERPL: 2.2 (CALC) (ref 1–2.5)
ALP SERPL-CCNC: 56 U/L (ref 36–130)
ALT SERPL-CCNC: 22 U/L (ref 9–46)
AST SERPL-CCNC: 17 U/L (ref 10–40)
BASOPHILS # BLD AUTO: 51 CELLS/UL (ref 0–200)
BASOPHILS NFR BLD AUTO: 0.9 %
BILIRUB SERPL-MCNC: 0.7 MG/DL (ref 0.2–1.2)
BUN SERPL-MCNC: 18 MG/DL (ref 7–25)
BUN/CREAT SERPL: NORMAL (CALC) (ref 6–22)
C TRACH RRNA SPEC QL NAA+PROBE: NOT DETECTED
CALCIUM SERPL-MCNC: 10.1 MG/DL (ref 8.6–10.3)
CHLORIDE SERPL-SCNC: 101 MMOL/L (ref 98–110)
CHOLEST SERPL-MCNC: 142 MG/DL
CHOLEST/HDLC SERPL: 2.3 (CALC)
CO2 SERPL-SCNC: 30 MMOL/L (ref 20–32)
CREAT SERPL-MCNC: 1.01 MG/DL (ref 0.6–1.35)
EOSINOPHIL # BLD AUTO: 211 CELLS/UL (ref 15–500)
EOSINOPHIL NFR BLD AUTO: 3.7 %
ERYTHROCYTE [DISTWIDTH] IN BLOOD BY AUTOMATED COUNT: 12.3 % (ref 11–15)
GLOBULIN SER CALC-MCNC: 2.3 G/DL (CALC) (ref 1.9–3.7)
GLUCOSE SERPL-MCNC: 94 MG/DL (ref 65–99)
HCT VFR BLD AUTO: 45.9 % (ref 38.5–50)
HCV AB S/CO SERPL IA: 0.03
HCV AB SERPL QL IA: NORMAL
HDLC SERPL-MCNC: 62 MG/DL
HGB BLD-MCNC: 15.7 G/DL (ref 13.2–17.1)
HIV 1+2 AB+HIV1 P24 AG SERPL QL IA: NORMAL
LDLC SERPL CALC-MCNC: 69 MG/DL (CALC)
LYMPHOCYTES # BLD AUTO: 1129 CELLS/UL (ref 850–3900)
LYMPHOCYTES NFR BLD AUTO: 19.8 %
MCH RBC QN AUTO: 32.4 PG (ref 27–33)
MCHC RBC AUTO-ENTMCNC: 34.2 G/DL (ref 32–36)
MCV RBC AUTO: 94.8 FL (ref 80–100)
MONOCYTES # BLD AUTO: 473 CELLS/UL (ref 200–950)
MONOCYTES NFR BLD AUTO: 8.3 %
NEUTROPHILS # BLD AUTO: 3836 CELLS/UL (ref 1500–7800)
NEUTROPHILS NFR BLD AUTO: 67.3 %
NONHDLC SERPL-MCNC: 80 MG/DL (CALC)
PLATELET # BLD AUTO: 215 THOUSAND/UL (ref 140–400)
PMV BLD REES-ECKER: 11.5 FL (ref 7.5–12.5)
POTASSIUM SERPL-SCNC: 4.2 MMOL/L (ref 3.5–5.3)
PROT SERPL-MCNC: 7.3 G/DL (ref 6.1–8.1)
RBC # BLD AUTO: 4.84 MILLION/UL (ref 4.2–5.8)
RPR SER QL: NORMAL
SL AMB EGFR AFRICAN AMERICAN: 118 ML/MIN/1.73M2
SL AMB EGFR NON AFRICAN AMERICAN: 101 ML/MIN/1.73M2
SODIUM SERPL-SCNC: 139 MMOL/L (ref 135–146)
TRIGL SERPL-MCNC: 40 MG/DL
TSH SERPL-ACNC: 0.81 MIU/L (ref 0.4–4.5)
WBC # BLD AUTO: 5.7 THOUSAND/UL (ref 3.8–10.8)

## 2021-12-13 DIAGNOSIS — F32.A DEPRESSION, UNSPECIFIED DEPRESSION TYPE: ICD-10-CM

## 2021-12-13 RX ORDER — BUPROPION HYDROCHLORIDE 300 MG/1
TABLET ORAL
Qty: 90 TABLET | Refills: 1 | Status: SHIPPED | OUTPATIENT
Start: 2021-12-13 | End: 2022-03-30 | Stop reason: ALTCHOICE

## 2022-01-10 ENCOUNTER — TELEPHONE (OUTPATIENT)
Dept: FAMILY MEDICINE CLINIC | Facility: CLINIC | Age: 27
End: 2022-01-10

## 2022-01-10 DIAGNOSIS — J45.20 MILD INTERMITTENT ASTHMA WITHOUT COMPLICATION: Primary | ICD-10-CM

## 2022-01-14 ENCOUNTER — OFFICE VISIT (OUTPATIENT)
Dept: URGENT CARE | Facility: CLINIC | Age: 27
End: 2022-01-14
Payer: COMMERCIAL

## 2022-01-14 ENCOUNTER — APPOINTMENT (OUTPATIENT)
Dept: RADIOLOGY | Facility: CLINIC | Age: 27
End: 2022-01-14
Payer: COMMERCIAL

## 2022-01-14 VITALS
OXYGEN SATURATION: 98 % | TEMPERATURE: 99.4 F | BODY MASS INDEX: 23.7 KG/M2 | HEART RATE: 94 BPM | HEIGHT: 72 IN | WEIGHT: 175 LBS

## 2022-01-14 DIAGNOSIS — J45.21 MILD INTERMITTENT ASTHMATIC BRONCHITIS WITH ACUTE EXACERBATION: ICD-10-CM

## 2022-01-14 DIAGNOSIS — Z86.16 HISTORY OF COVID-19: ICD-10-CM

## 2022-01-14 DIAGNOSIS — R05.3 PERSISTENT COUGH: ICD-10-CM

## 2022-01-14 DIAGNOSIS — R05.3 PERSISTENT COUGH: Primary | ICD-10-CM

## 2022-01-14 PROCEDURE — 99213 OFFICE O/P EST LOW 20 MIN: CPT | Performed by: PHYSICIAN ASSISTANT

## 2022-01-14 PROCEDURE — 71046 X-RAY EXAM CHEST 2 VIEWS: CPT

## 2022-01-14 RX ORDER — DOXYCYCLINE 100 MG/1
100 CAPSULE ORAL 2 TIMES DAILY
Qty: 14 CAPSULE | Refills: 0 | Status: SHIPPED | OUTPATIENT
Start: 2022-01-14 | End: 2022-01-21

## 2022-01-15 NOTE — PATIENT INSTRUCTIONS
Acute Bronchitis   WHAT YOU NEED TO KNOW:   Acute bronchitis is swelling and irritation in your lungs  It is usually caused by a virus and most often happens in the winter  Bronchitis may also be caused by bacteria or by a chemical irritant, such as smoke  DISCHARGE INSTRUCTIONS:   Return to the emergency department if:   · You cough up blood  · Your lips or fingernails turn blue  · You feel like you are not getting enough air when you breathe  Call your doctor if:   · Your symptoms do not go away or get worse, even after treatment  · Your cough does not get better within 4 weeks  · You have questions or concerns about your condition or care  Medicines: You may  need any of the following:  · Cough suppressants  decrease your urge to cough  · Decongestants  help loosen mucus in your lungs and make it easier to cough up  This can help you breathe easier  · Inhalers  may be given  Your healthcare provider may give you one or more inhalers to help you breathe easier and cough less  An inhaler gives your medicine to open your airways  Ask your healthcare provider to show you how to use your inhaler correctly  · Antibiotics  may be given for up to 5 days if your bronchitis is caused by bacteria  · Acetaminophen  decreases pain and fever  It is available without a doctor's order  Ask how much to take and how often to take it  Follow directions  Read the labels of all other medicines you are using to see if they also contain acetaminophen, or ask your doctor or pharmacist  Acetaminophen can cause liver damage if not taken correctly  Do not use more than 4 grams (4,000 milligrams) total of acetaminophen in one day  · NSAIDs  help decrease swelling and pain or fever  This medicine is available with or without a doctor's order  NSAIDs can cause stomach bleeding or kidney problems in certain people   If you take blood thinner medicine, always ask your healthcare provider if NSAIDs are safe for you  Always read the medicine label and follow directions  · Take your medicine as directed  Contact your healthcare provider if you think your medicine is not helping or if you have side effects  Tell him of her if you are allergic to any medicine  Keep a list of the medicines, vitamins, and herbs you take  Include the amounts, and when and why you take them  Bring the list or the pill bottles to follow-up visits  Carry your medicine list with you in case of an emergency  Self-care:   · Drink liquids as directed  You may need to drink more liquids than usual to stay hydrated  Ask how much liquid to drink each day and which liquids are best for you  · Use a cool mist humidifier  to increase air moisture in your home  This may make it easier for you to breathe and help decrease your cough  · Get more rest   Rest helps your body to heal  Slowly start to do more each day  Rest when you feel it is needed  · Avoid irritants in the air  Avoid chemicals, fumes, and dust  Wear a face mask if you must work around dust or fumes  Stay inside on days when air pollution levels are high  If you have allergies, stay inside when pollen counts are high  Do not use aerosol products, such as spray-on deodorant, bug spray, and hair spray  · Do not smoke or be around others who are smoking  Nicotine and other chemicals in cigarettes and cigars can cause lung damage  Ask your healthcare provider for information if you currently smoke and need help to quit  E-cigarettes or smokeless tobacco still contain nicotine  Talk to your healthcare provider before you use these products  Prevent acute bronchitis:       · Ask about vaccines you may need  Get a flu vaccine each year as soon as recommended, usually in September or October  Ask your healthcare provider if you should also get a pneumonia or COVID-19 vaccine   Your healthcare provider can tell you if you should also get other vaccines, and when to get them     · Prevent the spread of germs  You can decrease your risk for acute bronchitis and other illnesses by doing the following:     ? Wash your hands often with soap and water  Carry germ-killing hand lotion or gel with you  You can use the lotion or gel to clean your hands when soap and water are not available  ? Do not touch your eyes, nose, or mouth unless you have washed your hands first     ? Always cover your mouth when you cough to prevent the spread of germs  It is best to cough into a tissue or your shirt sleeve instead of into your hand  Ask those around you to cover their mouths when they cough  ? Try to avoid people who have a cold or the flu  If you are sick, stay away from others as much as possible  Follow up with your doctor as directed:  Write down questions you have so you will remember to ask them during your follow-up visits  © Copyright [employer] 2021 Information is for End User's use only and may not be sold, redistributed or otherwise used for commercial purposes  All illustrations and images included in CareNotes® are the copyrighted property of A D A M , Inc  or Dean Gonzalez  The above information is an  only  It is not intended as medical advice for individual conditions or treatments  Talk to your doctor, nurse or pharmacist before following any medical regimen to see if it is safe and effective for you

## 2022-01-15 NOTE — PROGRESS NOTES
[address] [facility]        NAME: Diamond England is a 28 y o  male  : 1995    MRN: [id number]  DATE:  2022  TIME: 8:42 AM    Assessment and Plan   Persistent cough [R05 3]  1  Persistent cough  XR chest pa & lateral    doxycycline monohydrate (MONODOX) 100 mg capsule   2  History of COVID-19  XR chest pa & lateral   3  Mild intermittent asthmatic bronchitis with acute exacerbation  doxycycline monohydrate (MONODOX) 100 mg capsule         Patient Instructions     Patient has persistent cough with history of asthma following COVID-19 diagnosis last month  His chest x-ray today is clear  He does have low-grade temp and is acutely symptomatic  This very well could be early asthmatic bronchitis exacerbation  I prescribed him a one-week course of doxycycline recommend hydration, rest, continuing with his asthma regimen, close observation  Follow up with PCP in 3-5 days  Proceed to  ER if symptoms worsen  Chief Complaint     Chief Complaint   Patient presents with    Cough     Was diagnosed COVID positive last month; has persistent cough, chest tightness; history of asthma; using nebulizer         History of Present Illness       Patient presents after being on a trip in [address] [place] last month and testing positive for COVID-19 now with persistent cough  He reports that when he was diagnosed COVID positive, that his symptoms were fairly mild resemble to cold  Since he developed the cough, he went to another urgent care and received both a Z-Jarad and prednisone  He reports chest tightness despite taking his normal asthma medication regimen  Cough  Associated symptoms include shortness of breath  Review of Systems   Review of Systems   Constitutional: Negative  Respiratory: Positive for cough, chest tightness and shortness of breath  Cardiovascular: Negative  Gastrointestinal: Negative  Genitourinary: Negative            Current Medications       Current Outpatient Medications:    albuterol (2 5 mg/3 mL) 0 083 % nebulizer solution, TAKE 1 VIAL BY NEBULIZATION EVERY 6 HOURS AS NEEDED FOR WHEEZING OR SHORTNESS OF BREATH, Disp: 75 mL, Rfl: 5    albuterol (ProAir HFA) 90 mcg/act inhaler, Inhale 2 puffs every 6 (six) hours as needed for wheezing, Disp: 1 Inhaler, Rfl: 3    buPROPion (WELLBUTRIN XL) 150 mg 24 hr tablet, TAKE 1 TABLET BY MOUTH EVERY DAY, Disp: 90 tablet, Rfl: 3    buPROPion (WELLBUTRIN XL) 300 mg 24 hr tablet, TAKE 1 TABLET BY MOUTH EVERY DAY, Disp: 90 tablet, Rfl: 1    clotrimazole-betamethasone (LOTRISONE) 1-0 05 % cream, Apply twice daily to upper arm as needed  , Disp: 30 g, Rfl: 1    doxycycline monohydrate (MONODOX) 100 mg capsule, Take 1 capsule (100 mg total) by mouth 2 (two) times a day for 7 days Take with food (non-dairy)  , Disp: 14 capsule, Rfl: 0    fexofenadine-pseudoephedrine (ALLEGRA-D 24) 180-240 MG per 24 hr tablet, Take 1 tablet by mouth daily, Disp: 90 tablet, Rfl: 3    fluticasone (FLONASE) 50 mcg/act nasal spray, 2 sprays into each nostril daily, Disp: , Rfl:     mometasone-formoterol (Dulera) 200-5 MCG/ACT inhaler, Inhale 2 puffs 2 (two) times a day Rinse mouth after use , Disp: 39 g, Rfl: 3    montelukast (SINGULAIR) 10 mg tablet, Take 1 tablet (10 mg total) by mouth daily, Disp: 90 tablet, Rfl: 3    Respiratory Therapy Supplies (Nebulizer) XIOMARA, Use nebulizer with Albuterol every 6 hours as needed for wheezing, Disp: 1 each, Rfl: 0    tiZANidine (ZANAFLEX) 4 mg tablet, TAKE 1/2 A TABLET EVERY 8 HOURS AS NEEDED, Disp: , Rfl: 3    valACYclovir (VALTREX) 1,000 mg tablet, TAKE 1 TABLET BY MOUTH TWICE A DAY, Disp: 180 tablet, Rfl: 1    Current Allergies     Allergies as of 01/14/2022 - Reviewed 01/14/2022   Allergen Reaction Noted    Cefprozil Hives 07/13/2012    Penicillins Hives 07/13/2012            The following portions of the patient's history were reviewed and updated as appropriate: allergies, current medications, past family history, past medical history, past social history, past surgical history and problem list      History reviewed  No pertinent past medical history  Past Surgical History:   Procedure Laterality Date    EAR SURGERY Left     TONSILECTOMY AND ADNOIDECTOMY      WRIST SURGERY Right        Family History   Problem Relation Age of Onset    No Known Problems Mother     Diabetes Father     [place]'s disease Father     Substance Abuse Neg Hx     Alcohol abuse Neg Hx          Medications have been verified  Objective   Pulse 94   Temp 99 4 °F (37 4 °C)   Ht 6' (1 829 m)   Wt 79 4 kg (175 lb)   SpO2 98%   BMI 23 73 kg/m²   No LMP for male patient  Physical Exam     Physical Exam  Vitals reviewed  Constitutional:       General: He is not in acute distress  Appearance: He is well-developed  HENT:      Mouth/Throat:      Mouth: Mucous membranes are moist       Pharynx: Oropharynx is clear  Cardiovascular:      Rate and Rhythm: Normal rate and regular rhythm  Heart sounds: Normal heart sounds  No murmur heard  Pulmonary:      Effort: Pulmonary effort is normal  No respiratory distress  Breath sounds: Normal breath sounds  Musculoskeletal:      Cervical back: Neck supple  Lymphadenopathy:      Cervical: No cervical adenopathy  Neurological:      Mental Status: He is alert and oriented to person, place, and time

## 2022-01-23 DIAGNOSIS — B00.9 HERPES INFECTION: ICD-10-CM

## 2022-01-24 RX ORDER — VALACYCLOVIR HYDROCHLORIDE 1 G/1
TABLET, FILM COATED ORAL
Qty: 180 TABLET | Refills: 1 | Status: SHIPPED | OUTPATIENT
Start: 2022-01-24 | End: 2022-07-28

## 2022-02-17 DIAGNOSIS — J30.9 ALLERGIC RHINITIS, UNSPECIFIED SEASONALITY, UNSPECIFIED TRIGGER: ICD-10-CM

## 2022-02-17 RX ORDER — FEXOFENADINE HYDROCHLORIDE AND PSEUDOEPHEDRINE HYDROCHLORIDE 180; 240 MG/1; MG/1
TABLET, FILM COATED, EXTENDED RELEASE ORAL
Qty: 90 TABLET | Refills: 3 | Status: SHIPPED | OUTPATIENT
Start: 2022-02-17

## 2022-03-28 ENCOUNTER — TELEPHONE (OUTPATIENT)
Dept: FAMILY MEDICINE CLINIC | Facility: CLINIC | Age: 27
End: 2022-03-28

## 2022-03-28 NOTE — TELEPHONE ENCOUNTER
Pt's mother called asking for phone call from Dr Kalpana Reynolds, wanted to speak to her before son's 3/30 appointment  Would not say to me why  Her words were, "I've known Dr Kalpana Reynolds for years and years, I'd rather save my concerns for her   Please have her call me" Phone number [phone]

## 2022-03-29 RX ORDER — OMEPRAZOLE 20 MG/1
20 CAPSULE, DELAYED RELEASE ORAL
COMMUNITY
Start: 2022-02-02 | End: 2022-03-30 | Stop reason: SDUPTHER

## 2022-03-30 ENCOUNTER — OFFICE VISIT (OUTPATIENT)
Dept: FAMILY MEDICINE CLINIC | Facility: CLINIC | Age: 27
End: 2022-03-30
Payer: COMMERCIAL

## 2022-03-30 VITALS
WEIGHT: 167.2 LBS | TEMPERATURE: 98.4 F | HEART RATE: 88 BPM | HEIGHT: 70 IN | OXYGEN SATURATION: 99 % | DIASTOLIC BLOOD PRESSURE: 84 MMHG | RESPIRATION RATE: 18 BRPM | BODY MASS INDEX: 23.94 KG/M2 | SYSTOLIC BLOOD PRESSURE: 130 MMHG

## 2022-03-30 DIAGNOSIS — K21.9 GASTROESOPHAGEAL REFLUX DISEASE WITHOUT ESOPHAGITIS: ICD-10-CM

## 2022-03-30 DIAGNOSIS — F33.0 MILD EPISODE OF RECURRENT MAJOR DEPRESSIVE DISORDER (HCC): Primary | ICD-10-CM

## 2022-03-30 PROCEDURE — 99214 OFFICE O/P EST MOD 30 MIN: CPT | Performed by: FAMILY MEDICINE

## 2022-03-30 PROCEDURE — 1036F TOBACCO NON-USER: CPT | Performed by: FAMILY MEDICINE

## 2022-03-30 PROCEDURE — 3008F BODY MASS INDEX DOCD: CPT | Performed by: FAMILY MEDICINE

## 2022-03-30 RX ORDER — OMEPRAZOLE 40 MG/1
40 CAPSULE, DELAYED RELEASE ORAL 2 TIMES DAILY
Qty: 180 CAPSULE | Refills: 0 | Status: SHIPPED | OUTPATIENT
Start: 2022-03-30

## 2022-03-30 NOTE — PROGRESS NOTES
Assessment/Plan:    Patient is 50-year-old male with worsening symptoms of depression  He has had a lot of up people lately -job loss, relationship issues, family issues and he had COVID over the holidays  He had been on bupropion but he did not feel that this was affective  I am starting him on a low dose of sertraline  He has been in touch with a therapist eat seen in the past and is open to have an appointment next week  I encouraged him to pursue this  Patient also relates that at 1 point in time he had seen a psychiatrist with started him on Risperdal but he did not like how this medication made him feel  I did discuss alcohol use and told him that it is not a good idea to be drinking alcohol because it is a depressant and also he should not mix this with medication  Recheck one month  We also discussed that he saw an internal medicine group in [place] because of his cough, thinking that he needed to see Pulmonary but in fact they treated him for GERD  He has increased his Prilosec to 40 mg twice a day  I told him that this may not be covered and certainly if he needs to take this any longer than 8 weeks, he needs to see GI if GERD symptoms persist      Diagnoses and all orders for this visit:    Mild episode of recurrent major depressive disorder ([facility] [address] )  -     sertraline (Zoloft) 50 mg tablet; Take 1 tablet (50 mg total) by mouth daily    Gastroesophageal reflux disease without esophagitis  -     omeprazole (PriLOSEC) 40 MG capsule; Take 1 capsule (40 mg total) by mouth 2 (two) times a day    Other orders  -     Discontinue: omeprazole (PriLOSEC) 20 mg delayed release capsule; Take 20 mg by mouth          Subjective:   Chief Complaint   Patient presents with    Follow-up     6 month check        Patient ID: Kee Carlson is a 28 y o  male  Had Kaycee in December  Has not felt right since  [facility]  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  Then started working for [employer]   [employer] back and forth every day  Relationship issues  Trouble sleeping  Not hungry  He should be able to see a therapist next week  Not sleeping  Is afraid to take  Recently diagnosed with reflux  The following portions of the patient's history were reviewed and updated as appropriate: allergies, current medications, past family history, past medical history, past social history, past surgical history and problem list     Review of Systems   Psychiatric/Behavioral: Positive for decreased concentration and sleep disturbance  Negative for self-injury and suicidal ideas  The patient is nervous/anxious  Objective:      /84 (BP Location: Left arm, Patient Position: Sitting)   Pulse 88   Temp 98 4 °F (36 9 °C) (Tympanic)   Resp 18   Ht 5' 10" (1 778 m)   Wt 75 8 kg (167 lb 3 2 oz)   SpO2 99%   BMI 23 99 kg/m²          Physical Exam  Vitals and nursing note reviewed  Constitutional:       General: He is not in acute distress  Neurological:      Mental Status: He is alert and oriented to person, place, and time  Psychiatric:         Behavior: Behavior normal       Comments:  Patient is very talkative and does speak rapidly

## 2022-05-01 DIAGNOSIS — F33.0 MILD EPISODE OF RECURRENT MAJOR DEPRESSIVE DISORDER (HCC): ICD-10-CM

## 2022-05-05 ENCOUNTER — OFFICE VISIT (OUTPATIENT)
Dept: FAMILY MEDICINE CLINIC | Facility: CLINIC | Age: 27
End: 2022-05-05
Payer: COMMERCIAL

## 2022-05-05 VITALS
HEIGHT: 70 IN | SYSTOLIC BLOOD PRESSURE: 120 MMHG | OXYGEN SATURATION: 98 % | HEART RATE: 95 BPM | DIASTOLIC BLOOD PRESSURE: 86 MMHG | BODY MASS INDEX: 24.77 KG/M2 | WEIGHT: 173 LBS | TEMPERATURE: 99.6 F

## 2022-05-05 DIAGNOSIS — R58 ECCHYMOSIS: ICD-10-CM

## 2022-05-05 DIAGNOSIS — R20.9 COLD EXTREMITIES: ICD-10-CM

## 2022-05-05 DIAGNOSIS — Z20.822 EXPOSURE TO COVID-19 VIRUS: ICD-10-CM

## 2022-05-05 DIAGNOSIS — L53.9 ERYTHEMA: Primary | ICD-10-CM

## 2022-05-05 DIAGNOSIS — L30.9 DERMATITIS: ICD-10-CM

## 2022-05-05 PROCEDURE — 99214 OFFICE O/P EST MOD 30 MIN: CPT | Performed by: FAMILY MEDICINE

## 2022-05-05 RX ORDER — TRIAMCINOLONE ACETONIDE 5 MG/G
CREAM TOPICAL
Qty: 30 G | Refills: 0 | Status: SHIPPED | OUTPATIENT
Start: 2022-05-05

## 2022-05-05 RX ORDER — CLOTRIMAZOLE AND BETAMETHASONE DIPROPIONATE 10; .64 MG/G; MG/G
CREAM TOPICAL
Qty: 30 G | Refills: 1 | Status: SHIPPED | OUTPATIENT
Start: 2022-05-05

## 2022-05-05 NOTE — PROGRESS NOTES
Assessment/Plan:  Patient is a 28year old male scheduled to follow up regarding anxiety  He did not start Sertraline prescribed at last visit  He has been talking to a counselor  He also had some concerns about a few toenails - they appear to have small amount of ecchymosis under the nail, but he does not recall injury  He does go for pedicures, but this does not usually happen  He also noticces that his hands get very red and dry  I have ordered SHWETA, CRP  Diagnoses and all orders for this visit:    Erythema  -     C-reactive protein; Future  -     SHWETA Screen, IFA, with Reflex to Titer and Pattern/Lupus Panel 1; Future  -     CBC and Platelet; Future  -     C-reactive protein  -     SHWETA Screen, IFA, with Reflex to Titer and Pattern/Lupus Panel 1  -     CBC and Platelet    Cold extremities  -     C-reactive protein; Future  -     SHWETA Screen, IFA, with Reflex to Titer and Pattern/Lupus Panel 1; Future  -     CBC and Platelet; Future  -     C-reactive protein  -     SHWETA Screen, IFA, with Reflex to Titer and Pattern/Lupus Panel 1  -     CBC and Platelet    Ecchymosis  -     C-reactive protein; Future  -     SHWETA Screen, IFA, with Reflex to Titer and Pattern/Lupus Panel 1; Future  -     CBC and Platelet; Future  -     C-reactive protein  -     SHWETA Screen, IFA, with Reflex to Titer and Pattern/Lupus Panel 1  -     CBC and Platelet    Exposure to COVID-19 virus  -     SARS CoV 2 SEROLOGY (COVID 19) AB (IGG), IA- Lab Collect; Future  -     SARS CoV 2 SEROLOGY (COVID 19) AB (IGG), IA- Lab Collect    Dermatitis  -     clotrimazole-betamethasone (LOTRISONE) 1-0 05 % cream; Apply twice daily to upper arm as needed  -     triamcinolone (KENALOG) 0 5 % cream; Apply up to twice daily sparingly as needed to hands      Other orders  -     SARS COV 2 AB (IGG) SPIKE, SEMI QN        Subjective:   Chief Complaint   Patient presents with    Anxiety    dry hands    spots on toe nails        Patient ID: Toy Cao is a 28 y o  male  Patient was seen about a month ago with anxiety - never started medication - he is seeing his therapist    He had a pedicure on April and noticed discoloration of nails on both feet about 1 5 weeks later  Also both hands with dryness  The following portions of the patient's history were reviewed and updated as appropriate: allergies, current medications, past family history, past medical history, past social history, past surgical history and problem list     Review of Systems   Constitutional: Negative for chills and fever  HENT: Negative for congestion and sore throat  Respiratory: Negative for chest tightness  Cardiovascular: Negative for chest pain and palpitations  Gastrointestinal: Negative for abdominal pain, constipation, diarrhea and nausea  Genitourinary: Negative for difficulty urinating  Skin: Positive for color change and rash  Neurological: Negative for dizziness and headaches  Psychiatric/Behavioral: The patient is nervous/anxious (improved)  Objective:      /86 (BP Location: Right arm, Patient Position: Sitting, Cuff Size: Standard)   Pulse 95   Temp 99 6 °F (37 6 °C) (Tympanic)   Ht 5' 10" (1 778 m)   Wt 78 5 kg (173 lb)   SpO2 98%   BMI 24 82 kg/m²          Physical Exam  Vitals and nursing note reviewed  Constitutional:       General: He is not in acute distress  HENT:      Head: Normocephalic  Neck:      Thyroid: No thyromegaly  Cardiovascular:      Rate and Rhythm: Normal rate and regular rhythm  Heart sounds: Normal heart sounds  Pulmonary:      Effort: Pulmonary effort is normal       Breath sounds: Normal breath sounds  Musculoskeletal:      Right lower leg: No edema  Left lower leg: No edema  Lymphadenopathy:      Cervical: No cervical adenopathy  Skin:     General: Skin is warm and dry  Findings: Erythema present  Comments: Left 2nd toenail -ecchymosis and deformity   Ecchymoisi under a couple other toenails  Both hands red with dryness in between fingers  Neurological:      Mental Status: He is alert and oriented to person, place, and time     Psychiatric:         Mood and Affect: Mood normal

## 2022-05-06 LAB
ANA SER QL IF: NEGATIVE
CRP SERPL-MCNC: 0.6 MG/L
ERYTHROCYTE [DISTWIDTH] IN BLOOD BY AUTOMATED COUNT: 12.5 % (ref 11–15)
HCT VFR BLD AUTO: 46.4 % (ref 38.5–50)
HGB BLD-MCNC: 16.1 G/DL (ref 13.2–17.1)
MCH RBC QN AUTO: 32.9 PG (ref 27–33)
MCHC RBC AUTO-ENTMCNC: 34.7 G/DL (ref 32–36)
MCV RBC AUTO: 94.9 FL (ref 80–100)
PLATELET # BLD AUTO: 250 THOUSAND/UL (ref 140–400)
PMV BLD REES-ECKER: 11.4 FL (ref 7.5–12.5)
RBC # BLD AUTO: 4.89 MILLION/UL (ref 4.2–5.8)
SARS-COV-2 IGG SERPL IA-ACNC: >150 INDEX
WBC # BLD AUTO: 6.3 THOUSAND/UL (ref 3.8–10.8)

## 2022-05-12 DIAGNOSIS — J45.909 UNCOMPLICATED ASTHMA, UNSPECIFIED ASTHMA SEVERITY, UNSPECIFIED WHETHER PERSISTENT: ICD-10-CM

## 2022-05-12 RX ORDER — ALBUTEROL SULFATE 90 UG/1
2 AEROSOL, METERED RESPIRATORY (INHALATION) EVERY 6 HOURS PRN
Qty: 18 G | Refills: 1 | Status: SHIPPED | OUTPATIENT
Start: 2022-05-12 | End: 2022-07-21

## 2022-07-21 DIAGNOSIS — J45.909 UNCOMPLICATED ASTHMA, UNSPECIFIED ASTHMA SEVERITY, UNSPECIFIED WHETHER PERSISTENT: ICD-10-CM

## 2022-07-21 RX ORDER — ALBUTEROL SULFATE 90 UG/1
AEROSOL, METERED RESPIRATORY (INHALATION)
Qty: 18 G | Refills: 1 | Status: SHIPPED | OUTPATIENT
Start: 2022-07-21

## 2022-07-28 DIAGNOSIS — B00.9 HERPES INFECTION: ICD-10-CM

## 2022-07-28 RX ORDER — VALACYCLOVIR HYDROCHLORIDE 1 G/1
TABLET, FILM COATED ORAL
Qty: 180 TABLET | Refills: 1 | Status: SHIPPED | OUTPATIENT
Start: 2022-07-28 | End: 2022-10-26

## 2022-08-02 ENCOUNTER — TELEPHONE (OUTPATIENT)
Dept: FAMILY MEDICINE CLINIC | Facility: CLINIC | Age: 27
End: 2022-08-02

## 2022-08-02 NOTE — TELEPHONE ENCOUNTER
Pt called requesting if we can refill tiZANidine (ZANAFLEX) 4 mg tablet [[id number]] prescribed by [facility]  His current doctor has retired and his next appt with a new provider will be 9/20  [facility] informed pt to talk to PCP about medication refill, he will be out of medication in 7 days  I scheduled pt with Dr Alber Barr if needed, he was last seen in office on 5/5  If appt is not needed please call pt to cancel

## 2022-08-03 DIAGNOSIS — M62.838 MUSCLE SPASM: Primary | ICD-10-CM

## 2022-08-03 RX ORDER — TIZANIDINE 4 MG/1
8 TABLET ORAL
Qty: 60 TABLET | Refills: 1 | Status: SHIPPED | OUTPATIENT
Start: 2022-08-03 | End: 2022-08-30

## 2022-08-03 NOTE — TELEPHONE ENCOUNTER
Call patient  I will send a prescription for the tizanidine  We have it recorded as 1/2 of a 4 mg tablet every 8 hours as needed  Does he take this  routinely every 8 hours  I need to know so I know how many to sent to the pharmacy

## 2022-08-03 NOTE — TELEPHONE ENCOUNTER
I spoke with pt, states he takes 4mg tabs, 2 tabs at bedtime everyday  Pt requesting 30 days and one refill to last until his [facility] appt mid September

## 2022-08-29 DIAGNOSIS — M62.838 MUSCLE SPASM: ICD-10-CM

## 2022-08-30 RX ORDER — TIZANIDINE 4 MG/1
TABLET ORAL
Qty: 60 TABLET | Refills: 1 | Status: SHIPPED | OUTPATIENT
Start: 2022-08-30 | End: 2022-09-22

## 2022-09-21 DIAGNOSIS — M62.838 MUSCLE SPASM: ICD-10-CM

## 2022-09-22 RX ORDER — TIZANIDINE 4 MG/1
TABLET ORAL
Qty: 60 TABLET | Refills: 1 | Status: SHIPPED | OUTPATIENT
Start: 2022-09-22

## 2022-09-22 NOTE — TELEPHONE ENCOUNTER
I sent RX but please call patient - I thought that he was going to be seeing a new physician from [facility] this month or are we going to be continuing to write for this?

## 2022-09-22 NOTE — TELEPHONE ENCOUNTER
Pt stated that he saw the new doctor 2 days ago and assumed that the [facility] notification was from them  He said he appreciates what we have done to cover him until he started up with them and the refills from here out will be from them

## 2022-09-28 DIAGNOSIS — L30.9 DERMATITIS: ICD-10-CM

## 2022-09-29 RX ORDER — TRIAMCINOLONE ACETONIDE 5 MG/G
CREAM TOPICAL
Qty: 30 G | Refills: 0 | Status: SHIPPED | OUTPATIENT
Start: 2022-09-29

## 2022-10-12 ENCOUNTER — RA CDI HCC (OUTPATIENT)
Dept: OTHER | Facility: HOSPITAL | Age: 27
End: 2022-10-12

## 2022-10-12 NOTE — PROGRESS NOTES
[facility]  coding opportunities       Chart reviewed, no opportunity found: CHART REVIEWED, NO OPPORTUNITY FOUND        Patients Insurance        Commercial Insurance: [address] [place] [address]

## 2022-10-25 DIAGNOSIS — J30.9 ALLERGIC RHINITIS, UNSPECIFIED SEASONALITY, UNSPECIFIED TRIGGER: ICD-10-CM

## 2022-10-25 RX ORDER — MONTELUKAST SODIUM 10 MG/1
TABLET ORAL
Qty: 90 TABLET | Refills: 2 | Status: SHIPPED | OUTPATIENT
Start: 2022-10-25

## 2022-11-02 ENCOUNTER — OFFICE VISIT (OUTPATIENT)
Dept: FAMILY MEDICINE CLINIC | Facility: CLINIC | Age: 27
End: 2022-11-02

## 2022-11-02 VITALS
HEART RATE: 88 BPM | OXYGEN SATURATION: 98 % | RESPIRATION RATE: 16 BRPM | BODY MASS INDEX: 23.91 KG/M2 | TEMPERATURE: 98.1 F | WEIGHT: 167 LBS | SYSTOLIC BLOOD PRESSURE: 110 MMHG | DIASTOLIC BLOOD PRESSURE: 70 MMHG | HEIGHT: 70 IN

## 2022-11-02 DIAGNOSIS — J45.41 MODERATE PERSISTENT ASTHMA WITH ACUTE EXACERBATION: ICD-10-CM

## 2022-11-02 DIAGNOSIS — B00.9 HSV (HERPES SIMPLEX VIRUS) INFECTION: Primary | ICD-10-CM

## 2022-11-02 DIAGNOSIS — L08.9 SKIN INFECTION: ICD-10-CM

## 2022-11-02 DIAGNOSIS — M27.8 MASS OF JAW: ICD-10-CM

## 2022-11-02 RX ORDER — PREDNISONE 20 MG/1
40 TABLET ORAL DAILY
Qty: 10 TABLET | Refills: 0 | Status: SHIPPED | OUTPATIENT
Start: 2022-11-02 | End: 2022-11-07

## 2022-11-02 RX ORDER — ACYCLOVIR 50 MG/G
OINTMENT TOPICAL
Qty: 30 G | Refills: 0 | Status: SHIPPED | OUTPATIENT
Start: 2022-11-02 | End: 2022-11-09

## 2022-11-02 NOTE — ASSESSMENT & PLAN NOTE
C/w acute exacerbation consistent with allergy trigger; add prednisone; reviewed medication and potential SE; c/w albuterol and Dulera; strict f/u guidance given

## 2022-11-02 NOTE — ASSESSMENT & PLAN NOTE
Infection appears consistent with HSV on exam; advised acyclovir although may be limited given has been there for almost 1 week; will had mupirocin to cover superimposed infection; already on suppressive higher dose Valtrex; f/u guidance given

## 2022-11-02 NOTE — PATIENT INSTRUCTIONS
Start prednisone  Follow up if no improvement  Use topical treatments as discussed  Complete ultrasound  Follow up if worsening symptoms

## 2022-11-02 NOTE — PROGRESS NOTES
Assessment/Plan:     1  HSV (herpes simplex virus) infection  Assessment & Plan:  Infection appears consistent with HSV on exam; advised acyclovir although may be limited given has been there for almost 1 week; will had mupirocin to cover superimposed infection; already on suppressive higher dose Valtrex; f/u guidance given    Orders:  -     mupirocin (BACTROBAN) 2 % ointment; Apply topically 3 (three) times a day  -     acyclovir (ZOVIRAX) 5 % ointment; Apply topically every 3 (three) hours for 7 days    2  Skin infection    3  Moderate persistent asthma with acute exacerbation  Assessment & Plan:  C/w acute exacerbation consistent with allergy trigger; add prednisone; reviewed medication and potential SE; c/w albuterol and Dulera; strict f/u guidance given    Orders:  -     predniSONE 20 mg tablet; Take 2 tablets (40 mg total) by mouth daily for 5 days    4  Mass of jaw  Assessment & Plan:  Suspect LN given on same side as HSV outbreak; will check u/s and f/u with results    Orders:  -     US head neck soft tissue; Future; Expected date: 11/02/2022        Subjective:      Patient ID: Silvio Medina is a 28 y o  male  Mass on face: Felt a bump above his jaw  Noticed it about 1 week ago  Concerned as he states this is new and unsure if it is cyst or something else  Cold sore seemed to develop afterwards  Patient noticed cold sore about 1 week ago  Pt was putting Abreva on it and has not gone away  Seemed to make it more irritated  Started about 1 5 weeks ago  Seems to be sleeping on right side of mouth  Currently on suppressive therapy Valtrex  Pt also concerned about asthma exacerbation  He has been moving over the last weekend from an old house and seems to have triggered allergies and asthma  States he has felt his chest getting more tight  Started when he was moving in a old house  Has an inhaler and has been using it as wells tight  Coughing more and cough is dry  No fevers   He has some PND but no sinus pressure  Also using nebulizer and Dulera  The following portions of the patient's history were reviewed and updated as appropriate: allergies, current medications, past family history, past medical history, past social history, past surgical history, and problem list     Review of Systems   Constitutional: Negative for chills and fever  HENT: Positive for postnasal drip  Negative for congestion  Respiratory: Positive for cough and chest tightness  Skin: Positive for rash  Objective:      /70 (BP Location: Left arm, Patient Position: Sitting, Cuff Size: Adult)   Pulse 88   Temp 98 1 °F (36 7 °C) (Temporal)   Resp 16   Ht 5' 9 69" (1 77 m)   Wt 75 8 kg (167 lb)   SpO2 98%   BMI 24 18 kg/m²          Physical Exam  Vitals reviewed  Constitutional:       General: He is not in acute distress  Appearance: Normal appearance  He is not ill-appearing, toxic-appearing or diaphoretic  HENT:      Head: Normocephalic and atraumatic  Right Ear: Tympanic membrane normal       Left Ear: Tympanic membrane normal       Nose: Congestion present  Mouth/Throat:      Pharynx: No oropharyngeal exudate or posterior oropharyngeal erythema  Eyes:      General: No scleral icterus  Right eye: No discharge  Left eye: No discharge  Conjunctiva/sclera: Conjunctivae normal    Cardiovascular:      Rate and Rhythm: Normal rate and regular rhythm  Pulses: Normal pulses  Heart sounds: Normal heart sounds  No murmur heard  No gallop  Pulmonary:      Effort: Pulmonary effort is normal  No respiratory distress  Breath sounds: Decreased air movement present  No stridor  Examination of the right-lower field reveals decreased breath sounds  Examination of the left-lower field reveals decreased breath sounds  Decreased breath sounds present  No wheezing, rhonchi or rales  Musculoskeletal:      Right lower leg: No edema  Left lower leg: No edema  Lymphadenopathy:      Cervical: No cervical adenopathy  Neurological:      General: No focal deficit present  Mental Status: He is alert and oriented to person, place, and time  Psychiatric:         Mood and Affect: Mood normal          Behavior: Behavior normal          Thought Content:  Thought content normal          Judgment: Judgment normal

## 2022-11-07 ENCOUNTER — OFFICE VISIT (OUTPATIENT)
Dept: FAMILY MEDICINE CLINIC | Facility: CLINIC | Age: 27
End: 2022-11-07

## 2022-11-07 VITALS
BODY MASS INDEX: 24.34 KG/M2 | OXYGEN SATURATION: 99 % | HEIGHT: 70 IN | TEMPERATURE: 99 F | HEART RATE: 82 BPM | DIASTOLIC BLOOD PRESSURE: 76 MMHG | WEIGHT: 170 LBS | SYSTOLIC BLOOD PRESSURE: 108 MMHG

## 2022-11-07 DIAGNOSIS — J45.41 MODERATE PERSISTENT ASTHMA WITH ACUTE EXACERBATION: ICD-10-CM

## 2022-11-07 DIAGNOSIS — J40 BRONCHITIS: Primary | ICD-10-CM

## 2022-11-07 RX ORDER — LEVOFLOXACIN 500 MG/1
500 TABLET, FILM COATED ORAL EVERY 24 HOURS
Qty: 7 TABLET | Refills: 0 | Status: SHIPPED | OUTPATIENT
Start: 2022-11-07 | End: 2022-11-14

## 2022-11-07 RX ORDER — ALBUTEROL SULFATE 2.5 MG/3ML
2.5 SOLUTION RESPIRATORY (INHALATION) EVERY 6 HOURS PRN
Qty: 75 ML | Refills: 5 | Status: SHIPPED | OUTPATIENT
Start: 2022-11-07

## 2022-11-07 NOTE — PROGRESS NOTES
Name: Barb Gilbert      : 1995      MRN: [id number]  Encounter Provider: Mandy Gibbons DO  Encounter Date: 2022   Encounter department: [address] [place] [address]  Bronchitis  Comments:  Levaquin  Patient advised to avoid exercise while on this antibiotic  Orders:  -     levofloxacin (LEVAQUIN) 500 mg tablet; Take 1 tablet (500 mg total) by mouth every 24 hours for 7 days    2  Moderate persistent asthma with acute exacerbation  -     albuterol (2 5 mg/3 mL) 0 083 % nebulizer solution; Take 3 mL (2 5 mg total) by nebulization every 6 (six) hours as needed for wheezing or shortness of breath         Subjective      Patient complains of persistent cough and upper respiratory congestion  Was seen last week for exacerbation of asthma and bronchitis  Received a 5 day prednisone burst but his symptoms have not significantly improved  Denies fever chills  Review of Systems   Constitutional: Negative  Respiratory: Negative  Negative for wheezing  Cardiovascular: Negative  Gastrointestinal: Negative  Genitourinary: Negative  Musculoskeletal: Negative  Psychiatric/Behavioral: Negative  Current Outpatient Medications on File Prior to Visit   Medication Sig   • albuterol (PROVENTIL HFA,VENTOLIN HFA) 90 mcg/act inhaler INHALE 2 PUFFS EVERY 6 HOURS AS NEEDED FOR WHEEZING   • Allegra-D Allergy & Congestion 180-240 MG per 24 hr tablet TAKE 1 TABLET BY MOUTH EVERY DAY   • clotrimazole-betamethasone (LOTRISONE) 1-0 05 % cream Apply twice daily to upper arm as needed  • fluticasone (FLONASE) 50 mcg/act nasal spray 2 sprays into each nostril daily   • mometasone-formoterol (Dulera) 200-5 MCG/ACT inhaler Inhale 2 puffs 2 (two) times a day Rinse mouth after use     • montelukast (SINGULAIR) 10 mg tablet TAKE 1 TABLET BY MOUTH EVERY DAY   • mupirocin (BACTROBAN) 2 % ointment Apply topically 3 (three) times a day   • omeprazole (PriLOSEC) 40 MG capsule Take 1 capsule (40 mg total) by mouth 2 (two) times a day   • Respiratory Therapy Supplies (Nebulizer) XIOMARA Use nebulizer with Albuterol every 6 hours as needed for wheezing   • tiZANidine (ZANAFLEX) 4 mg tablet TAKE 2 TABLETS BY MOUTH AT BEDTIME   • triamcinolone (KENALOG) 0 5 % cream APPLY UP TO TWICE DAILY SPARINGLY AS NEEDED TO HANDS     • valACYclovir (VALTREX) 1,000 mg tablet TAKE 1 TABLET BY MOUTH TWICE A DAY   • [DISCONTINUED] albuterol (2 5 mg/3 mL) 0 083 % nebulizer solution TAKE 1 VIAL BY NEBULIZATION EVERY 6 HOURS AS NEEDED FOR WHEEZING OR SHORTNESS OF BREATH   • acyclovir (ZOVIRAX) 5 % ointment Apply topically every 3 (three) hours for 7 days (Patient not taking: Reported on 11/7/2022)   • predniSONE 20 mg tablet Take 2 tablets (40 mg total) by mouth daily for 5 days (Patient not taking: Reported on 11/7/2022)   • sertraline (ZOLOFT) 50 mg tablet TAKE 1 TABLET BY MOUTH EVERY DAY (Patient not taking: No sig reported)       Objective     /76 (BP Location: Left arm, Patient Position: Sitting, Cuff Size: Standard)   Pulse 82   Temp 99 °F (37 2 °C) (Temporal)   Ht 5' 9 69" (1 77 m)   Wt 77 1 kg (170 lb)   SpO2 99%   BMI 24 61 kg/m²     Physical Exam  Matt Hearn DO

## 2022-11-08 DIAGNOSIS — J45.20 MILD INTERMITTENT ASTHMA WITHOUT COMPLICATION: ICD-10-CM

## 2022-11-08 RX ORDER — MOMETASONE FUROATE AND FORMOTEROL FUMARATE DIHYDRATE 200; 5 UG/1; UG/1
2 AEROSOL RESPIRATORY (INHALATION) 2 TIMES DAILY
Qty: 39 G | Refills: 3 | Status: SHIPPED | OUTPATIENT
Start: 2022-11-08

## 2022-11-09 ENCOUNTER — OFFICE VISIT (OUTPATIENT)
Dept: FAMILY MEDICINE CLINIC | Facility: CLINIC | Age: 27
End: 2022-11-09

## 2022-11-09 VITALS
WEIGHT: 168 LBS | TEMPERATURE: 99.3 F | OXYGEN SATURATION: 99 % | DIASTOLIC BLOOD PRESSURE: 80 MMHG | HEART RATE: 98 BPM | SYSTOLIC BLOOD PRESSURE: 118 MMHG | BODY MASS INDEX: 22.26 KG/M2 | HEIGHT: 73 IN

## 2022-11-09 DIAGNOSIS — Z11.4 SCREENING FOR HIV (HUMAN IMMUNODEFICIENCY VIRUS): ICD-10-CM

## 2022-11-09 DIAGNOSIS — Z23 NEED FOR INFLUENZA VACCINATION: ICD-10-CM

## 2022-11-09 DIAGNOSIS — Z13.1 SCREENING FOR DIABETES MELLITUS: ICD-10-CM

## 2022-11-09 DIAGNOSIS — Z11.59 ENCOUNTER FOR HEPATITIS C SCREENING TEST FOR LOW RISK PATIENT: ICD-10-CM

## 2022-11-09 DIAGNOSIS — Z13.29 SCREENING FOR THYROID DISORDER: ICD-10-CM

## 2022-11-09 DIAGNOSIS — Z11.3 SCREEN FOR STD (SEXUALLY TRANSMITTED DISEASE): ICD-10-CM

## 2022-11-09 DIAGNOSIS — Z13.6 SCREENING FOR CARDIOVASCULAR CONDITION: ICD-10-CM

## 2022-11-09 DIAGNOSIS — Z13.0 SCREENING FOR DEFICIENCY ANEMIA: ICD-10-CM

## 2022-11-09 DIAGNOSIS — Z00.00 ANNUAL PHYSICAL EXAM: Primary | ICD-10-CM

## 2022-11-09 NOTE — PROGRESS NOTES
ADULT ANNUAL [address] [place] [address]    NAME: Stefan Patient  AGE: 28 y o  SEX: male  : 1995     DATE: 2022     Assessment and Plan:     Patient was seen for annual physical exam   His examination today is unremarkable  He is up-to-date on appropriate immunizations and will receive a annual flu vaccine today  We did review guidelines for COVID boosters and he will receive the by Rita Ro in the next 2 weeks  Routine blood work was ordered today including STD testing  His chronic medical issues are stable  Problem List Items Addressed This Visit     Annual physical exam - Primary      Other Visit Diagnoses     Encounter for hepatitis C screening test for low risk patient        Relevant Orders    Hepatitis C Ab W/Refl To HCV RNA, Qn, PCR    Screening for cardiovascular condition        Relevant Orders    Lipid Panel with Direct LDL reflex    Screening for deficiency anemia        Relevant Orders    CBC and differential    Screening for diabetes mellitus        Relevant Orders    Comprehensive metabolic panel    Screening for thyroid disorder        Relevant Orders    TSH, 3rd generation with Free T4 reflex    Screening for HIV (human immunodeficiency virus)        Screen for STD (sexually transmitted disease)        Relevant Orders    RPR    Human Immunodeficiency Virus 1/2 Antigen / Antibody ( Fourth Generation) with Reflex Testing          Immunizations and preventive care screenings were discussed with patient today  Appropriate education was printed on patient's after visit summary  Counseling:  Alcohol/drug use: discussed moderation in alcohol intake, the recommendations for healthy alcohol use, and avoidance of illicit drug use  Dental Health: discussed importance of regular tooth brushing, flossing, and dental visits    Injury prevention: discussed safety/seat belts, safety helmets, smoke detectors, carbon dioxide detectors, and smoking near bedding or upholstery  Sexual health: discussed sexually transmitted diseases, partner selection, use of condoms, avoidance of unintended pregnancy, and contraceptive alternatives  Exercise: the importance of regular exercise/physical activity was discussed  Recommend exercise 3-5 times per week for at least 30 minutes  Return in 1 year (on 11/9/2023)  Chief Complaint:     Chief Complaint   Patient presents with   • Physical Exam     Physical      History of Present Illness:     Adult Annual Physical   Patient here for a comprehensive physical exam  The patient reports no problems  Diet and Physical Activity  Diet/Nutrition: well balanced diet and consuming 3-5 servings of fruits/vegetables daily  Exercise: moderate cardiovascular exercise, strength training exercises and 3-4 times a week on average  Depression Screening  PHQ-2/9 Depression Screening         General Health  Sleep: gets 7-8 hours of sleep on average  Hearing: normal - bilateral   Vision: previous LASIK surgery  Dental: regular dental visits   Health  History of STDs?: no      Review of Systems:     Review of Systems   Constitutional: Negative  HENT: Negative  Negative for congestion, ear pain, hearing loss, nosebleeds, sore throat and trouble swallowing  Eyes: Negative  Respiratory: Negative for apnea, cough, chest tightness, shortness of breath and wheezing  Cardiovascular: Negative  Gastrointestinal: Negative for abdominal pain, blood in stool, constipation, diarrhea, nausea and vomiting  Endocrine: Negative  Genitourinary: Negative for difficulty urinating, dysuria, frequency, hematuria and urgency  Musculoskeletal: Negative for arthralgias, joint swelling and myalgias  Skin: Negative for rash  Neurological: Negative for dizziness, syncope, light-headedness, numbness and headaches  Hematological: Negative      Psychiatric/Behavioral: Negative for confusion, dysphoric mood and sleep disturbance  The patient is not nervous/anxious  Past Medical History:     History reviewed  No pertinent past medical history     Past Surgical History:     Past Surgical History:   Procedure Laterality Date   • EAR SURGERY Left    • TONSILECTOMY AND ADNOIDECTOMY     • WRIST SURGERY Right       Social History:     Social History     Socioeconomic History   • Marital status: Single     Spouse name: None   • Number of children: None   • Years of education: None   • Highest education level: None   Occupational History   • None   Tobacco Use   • Smoking status: Never Smoker   • Smokeless tobacco: Never Used   Vaping Use   • Vaping Use: Never used   Substance and Sexual Activity   • Alcohol use: Yes     Comment: social   • Drug use: Never   • Sexual activity: Yes   Other Topics Concern   • None   Social History Narrative   • None     Social Determinants of Health     Financial Resource Strain: Not on file   Food Insecurity: Not on file   Transportation Needs: Not on file   Physical Activity: Not on file   Stress: Not on file   Social Connections: Not on file   Intimate Partner Violence: Not on file   Housing Stability: Not on file      Family History:     Family History   Problem Relation Age of Onset   • No Known Problems Mother    • Diabetes Father    • [place]'s disease Father    • Substance Abuse Neg Hx    • Alcohol abuse Neg Hx       Current Medications:     Current Outpatient Medications   Medication Sig Dispense Refill   • acyclovir (ZOVIRAX) 5 % ointment Apply topically every 3 (three) hours for 7 days (Patient not taking: Reported on 11/7/2022) 30 g 0   • albuterol (2 5 mg/3 mL) 0 083 % nebulizer solution Take 3 mL (2 5 mg total) by nebulization every 6 (six) hours as needed for wheezing or shortness of breath 75 mL 5   • albuterol (PROVENTIL HFA,VENTOLIN HFA) 90 mcg/act inhaler INHALE 2 PUFFS EVERY 6 HOURS AS NEEDED FOR WHEEZING 18 g 1   • Allegra-D Allergy & Congestion 180-240 MG per 24 hr tablet TAKE 1 TABLET BY MOUTH EVERY DAY 90 tablet 3   • clotrimazole-betamethasone (LOTRISONE) 1-0 05 % cream Apply twice daily to upper arm as needed  30 g 1   • fluticasone (FLONASE) 50 mcg/act nasal spray 2 sprays into each nostril daily     • levofloxacin (LEVAQUIN) 500 mg tablet Take 1 tablet (500 mg total) by mouth every 24 hours for 7 days 7 tablet 0   • mometasone-formoterol (Dulera) 200-5 MCG/ACT inhaler Inhale 2 puffs 2 (two) times a day Rinse mouth after use  39 g 3   • montelukast (SINGULAIR) 10 mg tablet TAKE 1 TABLET BY MOUTH EVERY DAY 90 tablet 2   • mupirocin (BACTROBAN) 2 % ointment Apply topically 3 (three) times a day 22 g 0   • omeprazole (PriLOSEC) 40 MG capsule Take 1 capsule (40 mg total) by mouth 2 (two) times a day 180 capsule 0   • Respiratory Therapy Supplies (Nebulizer) XIOMARA Use nebulizer with Albuterol every 6 hours as needed for wheezing 1 each 0   • sertraline (ZOLOFT) 50 mg tablet TAKE 1 TABLET BY MOUTH EVERY DAY (Patient not taking: No sig reported) 30 tablet 1   • tiZANidine (ZANAFLEX) 4 mg tablet TAKE 2 TABLETS BY MOUTH AT BEDTIME 60 tablet 1   • triamcinolone (KENALOG) 0 5 % cream APPLY UP TO TWICE DAILY SPARINGLY AS NEEDED TO HANDS  30 g 0   • valACYclovir (VALTREX) 1,000 mg tablet TAKE 1 TABLET BY MOUTH TWICE A  tablet 1     No current facility-administered medications for this visit  Allergies: Allergies   Allergen Reactions   • Cefprozil Hives   • Penicillins Hives      Physical Exam:     /80 (BP Location: Right arm, Patient Position: Sitting, Cuff Size: Adult)   Pulse 98   Temp 99 3 °F (37 4 °C)   Ht 6' 1" (1 854 m)   Wt 76 2 kg (168 lb)   SpO2 99%   BMI 22 16 kg/m²     Physical Exam  Vitals and nursing note reviewed  Constitutional:       Appearance: He is well-developed  HENT:      Head: Normocephalic        Right Ear: External ear normal       Left Ear: External ear normal       Nose: Nose normal    Eyes:      Conjunctiva/sclera: Conjunctivae normal       Pupils: Pupils are equal, round, and reactive to light  Cardiovascular:      Rate and Rhythm: Normal rate and regular rhythm  Heart sounds: Normal heart sounds  Pulmonary:      Effort: Pulmonary effort is normal       Breath sounds: Normal breath sounds  Abdominal:      General: Bowel sounds are normal       Palpations: Abdomen is soft  Musculoskeletal:         General: Normal range of motion  Cervical back: Normal range of motion and neck supple  Skin:     General: Skin is warm and dry  Psychiatric:         Behavior: Behavior normal          Thought Content:  Thought content normal          Judgment: Judgment normal           Nelia Hirsch, DO   [facility]

## 2022-11-09 NOTE — PATIENT INSTRUCTIONS

## 2023-01-11 LAB
ALBUMIN SERPL-MCNC: 4.8 G/DL (ref 3.6–5.1)
ALBUMIN/GLOB SERPL: 2.5 (CALC) (ref 1–2.5)
ALP SERPL-CCNC: 55 U/L (ref 36–130)
ALT SERPL-CCNC: 26 U/L (ref 9–46)
AST SERPL-CCNC: 17 U/L (ref 10–40)
BASOPHILS # BLD AUTO: 29 CELLS/UL (ref 0–200)
BASOPHILS NFR BLD AUTO: 0.6 %
BILIRUB SERPL-MCNC: 1 MG/DL (ref 0.2–1.2)
BUN SERPL-MCNC: 22 MG/DL (ref 7–25)
BUN/CREAT SERPL: NORMAL (CALC) (ref 6–22)
CALCIUM SERPL-MCNC: 9.8 MG/DL (ref 8.6–10.3)
CHLORIDE SERPL-SCNC: 103 MMOL/L (ref 98–110)
CHOLEST SERPL-MCNC: 149 MG/DL
CHOLEST/HDLC SERPL: 2.4 (CALC)
CO2 SERPL-SCNC: 30 MMOL/L (ref 20–32)
CREAT SERPL-MCNC: 1.04 MG/DL (ref 0.6–1.24)
EOSINOPHIL # BLD AUTO: 142 CELLS/UL (ref 15–500)
EOSINOPHIL NFR BLD AUTO: 2.9 %
ERYTHROCYTE [DISTWIDTH] IN BLOOD BY AUTOMATED COUNT: 11.8 % (ref 11–15)
GFR/BSA.PRED SERPLBLD CYS-BASED-ARV: 100 ML/MIN/1.73M2
GLOBULIN SER CALC-MCNC: 1.9 G/DL (CALC) (ref 1.9–3.7)
GLUCOSE SERPL-MCNC: 93 MG/DL (ref 65–99)
HCT VFR BLD AUTO: 45.5 % (ref 38.5–50)
HCV AB S/CO SERPL IA: 0.06
HCV AB SERPL QL IA: NORMAL
HDLC SERPL-MCNC: 61 MG/DL
HGB BLD-MCNC: 15.7 G/DL (ref 13.2–17.1)
HIV 1+2 AB+HIV1 P24 AG SERPL QL IA: NORMAL
LDLC SERPL CALC-MCNC: 71 MG/DL (CALC)
LYMPHOCYTES # BLD AUTO: 1318 CELLS/UL (ref 850–3900)
LYMPHOCYTES NFR BLD AUTO: 26.9 %
MCH RBC QN AUTO: 32.4 PG (ref 27–33)
MCHC RBC AUTO-ENTMCNC: 34.5 G/DL (ref 32–36)
MCV RBC AUTO: 94 FL (ref 80–100)
MONOCYTES # BLD AUTO: 412 CELLS/UL (ref 200–950)
MONOCYTES NFR BLD AUTO: 8.4 %
NEUTROPHILS # BLD AUTO: 2999 CELLS/UL (ref 1500–7800)
NEUTROPHILS NFR BLD AUTO: 61.2 %
NONHDLC SERPL-MCNC: 88 MG/DL (CALC)
PLATELET # BLD AUTO: 249 THOUSAND/UL (ref 140–400)
PMV BLD REES-ECKER: 11.8 FL (ref 7.5–12.5)
POTASSIUM SERPL-SCNC: 4.4 MMOL/L (ref 3.5–5.3)
PROT SERPL-MCNC: 6.7 G/DL (ref 6.1–8.1)
RBC # BLD AUTO: 4.84 MILLION/UL (ref 4.2–5.8)
RPR SER QL: NORMAL
SODIUM SERPL-SCNC: 139 MMOL/L (ref 135–146)
TRIGL SERPL-MCNC: 91 MG/DL
TSH SERPL-ACNC: 0.93 MIU/L (ref 0.4–4.5)
WBC # BLD AUTO: 4.9 THOUSAND/UL (ref 3.8–10.8)

## 2023-01-23 DIAGNOSIS — B00.9 HERPES INFECTION: ICD-10-CM

## 2023-01-23 RX ORDER — VALACYCLOVIR HYDROCHLORIDE 1 G/1
TABLET, FILM COATED ORAL
Qty: 180 TABLET | Refills: 1 | Status: SHIPPED | OUTPATIENT
Start: 2023-01-23 | End: 2023-04-23

## 2023-02-05 DIAGNOSIS — J30.9 ALLERGIC RHINITIS, UNSPECIFIED SEASONALITY, UNSPECIFIED TRIGGER: ICD-10-CM

## 2023-02-06 RX ORDER — FEXOFENADINE HYDROCHLORIDE AND PSEUDOEPHEDRINE HYDROCHLORIDE 180; 240 MG/1; MG/1
TABLET, FILM COATED, EXTENDED RELEASE ORAL
Qty: 90 TABLET | Refills: 3 | Status: SHIPPED | OUTPATIENT
Start: 2023-02-06

## 2023-03-17 ENCOUNTER — TELEPHONE (OUTPATIENT)
Dept: FAMILY MEDICINE CLINIC | Facility: CLINIC | Age: 28
End: 2023-03-17

## 2023-05-30 DIAGNOSIS — R21 RASH: ICD-10-CM

## 2023-05-31 DIAGNOSIS — R21 RASH: ICD-10-CM

## 2023-05-31 RX ORDER — HYDROCORTISONE 25 MG/ML
LOTION TOPICAL 2 TIMES DAILY
Qty: 59 ML | Refills: 0 | Status: SHIPPED | OUTPATIENT
Start: 2023-05-31

## 2023-05-31 RX ORDER — HYDROCORTISONE 25 MG/ML
LOTION TOPICAL 2 TIMES DAILY
Qty: 59 ML | Refills: 0 | Status: SHIPPED | OUTPATIENT
Start: 2023-05-31 | End: 2023-05-31 | Stop reason: SDUPTHER

## 2023-08-15 DIAGNOSIS — B00.9 HERPES INFECTION: ICD-10-CM

## 2023-08-15 DIAGNOSIS — J30.9 ALLERGIC RHINITIS, UNSPECIFIED SEASONALITY, UNSPECIFIED TRIGGER: ICD-10-CM

## 2023-08-15 RX ORDER — MONTELUKAST SODIUM 10 MG/1
TABLET ORAL
Qty: 90 TABLET | Refills: 2 | Status: SHIPPED | OUTPATIENT
Start: 2023-08-15

## 2023-08-15 RX ORDER — VALACYCLOVIR HYDROCHLORIDE 1 G/1
TABLET, FILM COATED ORAL
Qty: 180 TABLET | Refills: 1 | Status: SHIPPED | OUTPATIENT
Start: 2023-08-15 | End: 2023-11-13

## 2023-08-25 ENCOUNTER — TELEPHONE (OUTPATIENT)
Dept: DERMATOLOGY | Facility: CLINIC | Age: 28
End: 2023-08-25

## 2023-08-25 NOTE — TELEPHONE ENCOUNTER
Rec'd call from patient requesting NEW for ECZEMA. Patient would prefer to be seen by Dr. Mariposa Cuello he will take first opening. Explained wait / cancellation list / MyChart notification. Patient verbalized understanding. Created wait list for patient.

## 2023-09-07 ENCOUNTER — OFFICE VISIT (OUTPATIENT)
Dept: URGENT CARE | Facility: CLINIC | Age: 28
End: 2023-09-07
Payer: COMMERCIAL

## 2023-09-07 VITALS
HEART RATE: 103 BPM | OXYGEN SATURATION: 99 % | RESPIRATION RATE: 16 BRPM | TEMPERATURE: 99 F | DIASTOLIC BLOOD PRESSURE: 67 MMHG | SYSTOLIC BLOOD PRESSURE: 116 MMHG

## 2023-09-07 DIAGNOSIS — J20.9 ACUTE BRONCHITIS, UNSPECIFIED ORGANISM: Primary | ICD-10-CM

## 2023-09-07 DIAGNOSIS — L30.9 ECZEMA, UNSPECIFIED TYPE: ICD-10-CM

## 2023-09-07 PROCEDURE — 99213 OFFICE O/P EST LOW 20 MIN: CPT | Performed by: NURSE PRACTITIONER

## 2023-09-07 RX ORDER — AZITHROMYCIN 250 MG/1
TABLET, FILM COATED ORAL
Qty: 6 TABLET | Refills: 0 | Status: SHIPPED | OUTPATIENT
Start: 2023-09-07 | End: 2023-09-11

## 2023-09-07 RX ORDER — HYDROCORTISONE 25 MG/ML
LOTION TOPICAL 2 TIMES DAILY
Qty: 59 ML | Refills: 0 | Status: SHIPPED | OUTPATIENT
Start: 2023-09-07

## 2023-09-07 RX ORDER — METHYLPREDNISOLONE 4 MG/1
TABLET ORAL
Qty: 21 EACH | Refills: 0 | Status: SHIPPED | OUTPATIENT
Start: 2023-09-07

## 2023-09-07 RX ORDER — SODIUM FLUORIDE1.1%, POTASSIUM NITRATE 5% 5.8; 57.5 MG/ML; MG/ML
GEL, DENTIFRICE DENTAL
COMMUNITY
Start: 2023-06-16

## 2023-09-07 NOTE — PROGRESS NOTES
[facility]        NAME: Carey Trotter is a 33 y.o. male  : 1995    MRN: [id number]  DATE: 2023  TIME: 12:44 PM    Assessment and Plan   Acute bronchitis, unspecified organism [J20.9]  1. Acute bronchitis, unspecified organism  methylPREDNISolone 4 MG tablet therapy pack    azithromycin (ZITHROMAX) 250 mg tablet      2. Eczema, unspecified type  hydrocortisone 2.5 % lotion        Acute symptomatic educated patient that most likely viral in origin and no recommendation for antibiotic. covid negative rapid at home. Educated to increase fluids, otc cold medicine as needed and with no improvement 4-5 days can start zpack. Will recommend start medrol dose pack educated on s/e and proper use of medication. Will also provide hydrocortisone 2.5% as needed for eczema. Discussed f/u with pcp with worsening of symptoms/no improvement. Patient Instructions       Follow up with PCP in 3-5 days. Proceed to  ER if symptoms worsen. Chief Complaint     Chief Complaint   Patient presents with   • Cough     Pt reports "respiratory stuff" since Monday. Home COVID test was negative. History of Present Illness       Patient arrives with c/o st congestion productive cough. Denies sob/chest pain/fever/n/v/d. Also with rash of the lower right extremity with scaling and itchiness. Has hx of eczema with relief using hydrocortisone 2.5% has none left. Review of Systems   Review of Systems   Constitutional: Negative for activity change, appetite change, chills, fatigue and fever. HENT: Positive for congestion. Negative for rhinorrhea, sinus pressure, sinus pain and sore throat. Respiratory: Positive for cough and chest tightness. Negative for shortness of breath and wheezing. Gastrointestinal: Negative for constipation, diarrhea, nausea and vomiting. Musculoskeletal: Negative for myalgias. Skin: Positive for rash. Negative for color change and pallor.    Neurological: Negative for dizziness, syncope, weakness, light-headedness and headaches. Hematological: Negative for adenopathy. Psychiatric/Behavioral: Negative for agitation and confusion. Current Medications       Current Outpatient Medications:   •  azithromycin (ZITHROMAX) 250 mg tablet, Take 2 tablets today then 1 tablet daily x 4 days, Disp: 6 tablet, Rfl: 0  •  hydrocortisone 2.5 % lotion, Apply topically 2 (two) times a day, Disp: 59 mL, Rfl: 0  •  methylPREDNISolone 4 MG tablet therapy pack, Use as directed on package, Disp: 21 each, Rfl: 0  •  acyclovir (ZOVIRAX) 5 % ointment, Apply topically every 3 (three) hours for 7 days (Patient not taking: Reported on 11/7/2022), Disp: 30 g, Rfl: 0  •  albuterol (2.5 mg/3 mL) 0.083 % nebulizer solution, Take 3 mL (2.5 mg total) by nebulization every 6 (six) hours as needed for wheezing or shortness of breath, Disp: 75 mL, Rfl: 5  •  albuterol (PROVENTIL HFA,VENTOLIN HFA) 90 mcg/act inhaler, INHALE 2 PUFFS EVERY 6 HOURS AS NEEDED FOR WHEEZING, Disp: 18 g, Rfl: 1  •  Allegra-D Allergy & Congestion 180-240 MG per 24 hr tablet, TAKE 1 TABLET BY MOUTH EVERY DAY-OTC NC BY INS, Disp: 90 tablet, Rfl: 3  •  clotrimazole-betamethasone (LOTRISONE) 1-0.05 % cream, Apply twice daily to upper arm as needed. , Disp: 30 g, Rfl: 1  •  fluticasone (FLONASE) 50 mcg/act nasal spray, 2 sprays into each nostril daily, Disp: , Rfl:   •  mometasone-formoterol (Dulera) 200-5 MCG/ACT inhaler, Inhale 2 puffs 2 (two) times a day Rinse mouth after use., Disp: 39 g, Rfl: 3  •  montelukast (SINGULAIR) 10 mg tablet, TAKE 1 TABLET BY MOUTH EVERY DAY, Disp: 90 tablet, Rfl: 2  •  mupirocin (BACTROBAN) 2 % ointment, Apply topically 3 (three) times a day (Patient not taking: Reported on 4/14/2023), Disp: 22 g, Rfl: 0  •  omeprazole (PriLOSEC) 40 MG capsule, Take 1 capsule (40 mg total) by mouth 2 (two) times a day, Disp: 180 capsule, Rfl: 0  •  Respiratory Therapy Supplies (Nebulizer) XIOMARA, Use nebulizer with Albuterol every 6 hours as needed for wheezing, Disp: 1 each, Rfl: 0  •  sertraline (ZOLOFT) 50 mg tablet, TAKE 1 TABLET BY MOUTH EVERY DAY (Patient not taking: Reported on 4/14/2023), Disp: 30 tablet, Rfl: 1  •  Sodium Fluoride 5000 Sensitive 1.1-5 % GEL, Use as directed, Disp: , Rfl:   •  tiZANidine (ZANAFLEX) 4 mg tablet, TAKE 2 TABLETS BY MOUTH AT BEDTIME, Disp: 60 tablet, Rfl: 1  •  triamcinolone (KENALOG) 0.5 % cream, APPLY UP TO TWICE DAILY SPARINGLY AS NEEDED TO HANDS. (Patient not taking: Reported on 4/14/2023), Disp: 30 g, Rfl: 0  •  valACYclovir (VALTREX) 1,000 mg tablet, TAKE 1 TABLET BY MOUTH TWICE A DAY, Disp: 180 tablet, Rfl: 1    Current Allergies     Allergies as of 09/07/2023 - Reviewed 09/07/2023   Allergen Reaction Noted   • Cefprozil Hives 07/13/2012   • Penicillins Hives 07/13/2012            The following portions of the patient's history were reviewed and updated as appropriate: allergies, current medications, past family history, past medical history, past social history, past surgical history and problem list.     History reviewed. No pertinent past medical history. Past Surgical History:   Procedure Laterality Date   • EAR SURGERY Left    • TONSILECTOMY AND ADNOIDECTOMY     • WRIST SURGERY Right        Family History   Problem Relation Age of Onset   • No Known Problems Mother    • Diabetes Father    • [place]'s disease Father    • Substance Abuse Neg Hx    • Alcohol abuse Neg Hx          Medications have been verified. Objective   /67   Pulse 103   Temp 99 °F (37.2 °C)   Resp 16   SpO2 99%   No LMP for male patient. Physical Exam     Physical Exam  Vitals and nursing note reviewed. Constitutional:       General: He is not in acute distress. Appearance: Normal appearance. He is ill-appearing. He is not toxic-appearing or diaphoretic. HENT:      Head: Normocephalic and atraumatic.       Right Ear: Tympanic membrane, ear canal and external ear normal. There is no impacted cerumen. Left Ear: Tympanic membrane, ear canal and external ear normal. There is no impacted cerumen. Nose: Congestion present. No rhinorrhea. Mouth/Throat:      Mouth: Mucous membranes are moist.   Eyes:      General: No scleral icterus. Right eye: No discharge. Left eye: No discharge. Conjunctiva/sclera: Conjunctivae normal.   Cardiovascular:      Rate and Rhythm: Normal rate and regular rhythm. Pulmonary:      Effort: Pulmonary effort is normal. No respiratory distress. Musculoskeletal:         General: Normal range of motion. Cervical back: Normal range of motion. Lymphadenopathy:      Cervical: No cervical adenopathy. Skin:     General: Skin is dry. Findings: No rash. Neurological:      Mental Status: He is alert and oriented to person, place, and time. Psychiatric:         Mood and Affect: Mood normal.         Behavior: Behavior normal.         Thought Content:  Thought content normal.         Judgment: Judgment normal.

## 2023-09-15 ENCOUNTER — OFFICE VISIT (OUTPATIENT)
Dept: URGENT CARE | Facility: CLINIC | Age: 28
End: 2023-09-15
Payer: COMMERCIAL

## 2023-09-15 VITALS
WEIGHT: 167 LBS | HEART RATE: 82 BPM | TEMPERATURE: 97.9 F | BODY MASS INDEX: 22.62 KG/M2 | DIASTOLIC BLOOD PRESSURE: 82 MMHG | RESPIRATION RATE: 18 BRPM | OXYGEN SATURATION: 99 % | SYSTOLIC BLOOD PRESSURE: 128 MMHG | HEIGHT: 72 IN

## 2023-09-15 DIAGNOSIS — J20.9 ACUTE BRONCHITIS, UNSPECIFIED ORGANISM: Primary | ICD-10-CM

## 2023-09-15 PROCEDURE — 99213 OFFICE O/P EST LOW 20 MIN: CPT | Performed by: NURSE PRACTITIONER

## 2023-09-15 RX ORDER — PREDNISONE 20 MG/1
40 TABLET ORAL DAILY
Qty: 10 TABLET | Refills: 0 | Status: SHIPPED | OUTPATIENT
Start: 2023-09-15 | End: 2023-09-20

## 2023-09-15 NOTE — PROGRESS NOTES
[facility]        NAME: Carlos Enrique Zavala is a 33 y.o. male  : 1995    MRN: [id number]  DATE: September 15, 2023  TIME: 11:41 AM    Assessment and Plan   Acute bronchitis, unspecified organism [J20.9]  1. Acute bronchitis, unspecified organism  predniSONE 20 mg tablet        Will recommend patient start prednisone burst 40 mg daily x5 days. Educated on side effects proper use of medication follow-up with PCP with any worsening of symptoms no improvement. Patient Instructions       Follow up with PCP in 3-5 days. Proceed to  ER if symptoms worsen. Chief Complaint     Chief Complaint   Patient presents with   • Cough     Pt presents with lingering cough following bronchitis. He was treated with a Z pack prednisone taper. History of Present Illness       Patient was a 79-year-old male arrives with complaints of persistent cough. Patient was treated approximately a week ago for bronchitis with a Z-Jarad and a Medrol Dosepak. Patient reports continues with cough typically he reports he needs a steroid burst not a steroid taper. Denies shortness of breath chest pain fever and all other viral symptoms at this time      Review of Systems   Review of Systems   Constitutional: Negative for activity change, appetite change, chills, fatigue and fever. HENT: Negative for congestion, rhinorrhea, sinus pressure, sinus pain and sore throat. Respiratory: Positive for cough. Negative for chest tightness, shortness of breath and wheezing. Gastrointestinal: Negative for constipation, diarrhea, nausea and vomiting. Musculoskeletal: Negative for myalgias. Skin: Negative for color change, pallor and rash. Neurological: Negative for dizziness, syncope, weakness, light-headedness and headaches. Hematological: Negative for adenopathy. Psychiatric/Behavioral: Negative for agitation and confusion.          Current Medications       Current Outpatient Medications:   •  acyclovir (ZOVIRAX) 5 % ointment, Apply topically every 3 (three) hours for 7 days, Disp: 30 g, Rfl: 0  •  albuterol (2.5 mg/3 mL) 0.083 % nebulizer solution, Take 3 mL (2.5 mg total) by nebulization every 6 (six) hours as needed for wheezing or shortness of breath, Disp: 75 mL, Rfl: 5  •  albuterol (PROVENTIL HFA,VENTOLIN HFA) 90 mcg/act inhaler, INHALE 2 PUFFS EVERY 6 HOURS AS NEEDED FOR WHEEZING, Disp: 18 g, Rfl: 1  •  Allegra-D Allergy & Congestion 180-240 MG per 24 hr tablet, TAKE 1 TABLET BY MOUTH EVERY DAY-OTC NC BY INS, Disp: 90 tablet, Rfl: 3  •  clotrimazole-betamethasone (LOTRISONE) 1-0.05 % cream, Apply twice daily to upper arm as needed. , Disp: 30 g, Rfl: 1  •  fluticasone (FLONASE) 50 mcg/act nasal spray, 2 sprays into each nostril daily, Disp: , Rfl:   •  hydrocortisone 2.5 % lotion, Apply topically 2 (two) times a day, Disp: 59 mL, Rfl: 0  •  mometasone-formoterol (Dulera) 200-5 MCG/ACT inhaler, Inhale 2 puffs 2 (two) times a day Rinse mouth after use., Disp: 39 g, Rfl: 3  •  montelukast (SINGULAIR) 10 mg tablet, TAKE 1 TABLET BY MOUTH EVERY DAY, Disp: 90 tablet, Rfl: 2  •  omeprazole (PriLOSEC) 40 MG capsule, Take 1 capsule (40 mg total) by mouth 2 (two) times a day, Disp: 180 capsule, Rfl: 0  •  predniSONE 20 mg tablet, Take 2 tablets (40 mg total) by mouth daily for 5 days, Disp: 10 tablet, Rfl: 0  •  Respiratory Therapy Supplies (Nebulizer) XIOMARA, Use nebulizer with Albuterol every 6 hours as needed for wheezing, Disp: 1 each, Rfl: 0  •  Sodium Fluoride 5000 Sensitive 1.1-5 % GEL, Use as directed, Disp: , Rfl:   •  tiZANidine (ZANAFLEX) 4 mg tablet, TAKE 2 TABLETS BY MOUTH AT BEDTIME, Disp: 60 tablet, Rfl: 1  •  triamcinolone (KENALOG) 0.5 % cream, APPLY UP TO TWICE DAILY SPARINGLY AS NEEDED TO HANDS., Disp: 30 g, Rfl: 0  •  valACYclovir (VALTREX) 1,000 mg tablet, TAKE 1 TABLET BY MOUTH TWICE A DAY, Disp: 180 tablet, Rfl: 1  •  methylPREDNISolone 4 MG tablet therapy pack, Use as directed on package (Patient not taking: Reported on 9/15/2023), Disp: 21 each, Rfl: 0  •  mupirocin (BACTROBAN) 2 % ointment, Apply topically 3 (three) times a day (Patient not taking: Reported on 4/14/2023), Disp: 22 g, Rfl: 0  •  sertraline (ZOLOFT) 50 mg tablet, TAKE 1 TABLET BY MOUTH EVERY DAY (Patient not taking: Reported on 4/14/2023), Disp: 30 tablet, Rfl: 1    Current Allergies     Allergies as of 09/15/2023 - Reviewed 09/15/2023   Allergen Reaction Noted   • Cefprozil Hives 07/13/2012   • Penicillins Hives 07/13/2012            The following portions of the patient's history were reviewed and updated as appropriate: allergies, current medications, past family history, past medical history, past social history, past surgical history and problem list.     Past Medical History:   Diagnosis Date   • Allergic    • Asthma        Past Surgical History:   Procedure Laterality Date   • EAR SURGERY Left    • TONSILECTOMY AND ADNOIDECTOMY     • WRIST SURGERY Right        Family History   Problem Relation Age of Onset   • No Known Problems Mother    • Diabetes Father    • [place]'s disease Father    • Substance Abuse Neg Hx    • Alcohol abuse Neg Hx          Medications have been verified. Objective   /82   Pulse 82   Temp 97.9 °F (36.6 °C)   Resp 18   Ht 6' (1.829 m)   Wt 75.8 kg (167 lb)   SpO2 99%   BMI 22.65 kg/m²   No LMP for male patient. Physical Exam     Physical Exam  Vitals and nursing note reviewed. Constitutional:       General: He is not in acute distress. Appearance: Normal appearance. He is not ill-appearing, toxic-appearing or diaphoretic. HENT:      Head: Normocephalic and atraumatic. Nose: No congestion or rhinorrhea. Mouth/Throat:      Mouth: Mucous membranes are moist.   Eyes:      General: No scleral icterus. Right eye: No discharge. Left eye: No discharge. Conjunctiva/sclera: Conjunctivae normal.   Cardiovascular:      Rate and Rhythm: Normal rate and regular rhythm. Pulmonary:      Effort: Pulmonary effort is normal. No respiratory distress. Breath sounds: Normal breath sounds. No stridor. No wheezing, rhonchi or rales. Musculoskeletal:         General: Normal range of motion. Cervical back: Normal range of motion. Skin:     General: Skin is dry. Neurological:      Mental Status: He is alert and oriented to person, place, and time. Psychiatric:         Mood and Affect: Mood normal.         Behavior: Behavior normal.         Thought Content:  Thought content normal.         Judgment: Judgment normal.

## 2023-10-03 ENCOUNTER — TELEPHONE (OUTPATIENT)
Dept: FAMILY MEDICINE CLINIC | Facility: CLINIC | Age: 28
End: 2023-10-03

## 2023-10-03 NOTE — TELEPHONE ENCOUNTER
Patient would like to request Tizandine with 2 refills sent to his [facility] pharmacy on file. Patient hasn't been seen since 11.2022 and can not see his orthopedist until November. Which is where he normally gets this medication from. Please advise .

## 2023-10-05 NOTE — TELEPHONE ENCOUNTER
Called pt. Explained to pt that he would need to get his refills from the provider that is currently managing this medication. Pt understood and will follow up with Ortho.

## 2023-11-07 ENCOUNTER — RA CDI HCC (OUTPATIENT)
Dept: OTHER | Facility: HOSPITAL | Age: 28
End: 2023-11-07

## 2023-11-07 NOTE — PROGRESS NOTES
[facility] coding opportunities          Chart Reviewed number of suggestions sent to Provider: 1     Patients Insurance        Commercial Insurance: [employer]       J45.41:  Moderate persistent asthma with (acute) exacerbation [J45.41]     Last assessed on 11/7/2022 - please review and assess and document per MEAT if applicable for [id number]

## 2023-11-13 ENCOUNTER — OFFICE VISIT (OUTPATIENT)
Dept: FAMILY MEDICINE CLINIC | Facility: CLINIC | Age: 28
End: 2023-11-13
Payer: COMMERCIAL

## 2023-11-13 VITALS
BODY MASS INDEX: 22.75 KG/M2 | SYSTOLIC BLOOD PRESSURE: 96 MMHG | HEART RATE: 78 BPM | WEIGHT: 168 LBS | OXYGEN SATURATION: 98 % | TEMPERATURE: 98.7 F | DIASTOLIC BLOOD PRESSURE: 68 MMHG | HEIGHT: 72 IN

## 2023-11-13 DIAGNOSIS — Z13.29 SCREENING FOR THYROID DISORDER: ICD-10-CM

## 2023-11-13 DIAGNOSIS — Z13.1 SCREENING FOR DIABETES MELLITUS: ICD-10-CM

## 2023-11-13 DIAGNOSIS — Z00.00 ANNUAL PHYSICAL EXAM: ICD-10-CM

## 2023-11-13 DIAGNOSIS — Z13.6 SCREENING FOR CARDIOVASCULAR CONDITION: ICD-10-CM

## 2023-11-13 DIAGNOSIS — Z11.3 SCREEN FOR STD (SEXUALLY TRANSMITTED DISEASE): ICD-10-CM

## 2023-11-13 DIAGNOSIS — M54.9 CHRONIC BACK PAIN, UNSPECIFIED BACK LOCATION, UNSPECIFIED BACK PAIN LATERALITY: ICD-10-CM

## 2023-11-13 DIAGNOSIS — Z23 ENCOUNTER FOR IMMUNIZATION: Primary | ICD-10-CM

## 2023-11-13 DIAGNOSIS — L30.9 DERMATITIS: ICD-10-CM

## 2023-11-13 DIAGNOSIS — G89.29 CHRONIC BACK PAIN, UNSPECIFIED BACK LOCATION, UNSPECIFIED BACK PAIN LATERALITY: ICD-10-CM

## 2023-11-13 DIAGNOSIS — R21 RASH: ICD-10-CM

## 2023-11-13 DIAGNOSIS — Z13.0 SCREENING FOR DEFICIENCY ANEMIA: ICD-10-CM

## 2023-11-13 PROCEDURE — 99395 PREV VISIT EST AGE 18-39: CPT | Performed by: FAMILY MEDICINE

## 2023-11-13 PROCEDURE — 90686 IIV4 VACC NO PRSV 0.5 ML IM: CPT

## 2023-11-13 PROCEDURE — 90471 IMMUNIZATION ADMIN: CPT

## 2023-11-13 RX ORDER — BETAMETHASONE DIPROPIONATE 0.5 MG/G
CREAM TOPICAL 2 TIMES DAILY
Qty: 30 G | Refills: 3 | Status: SHIPPED | OUTPATIENT
Start: 2023-11-13

## 2023-11-13 NOTE — PROGRESS NOTES
ADULT ANNUAL [address] [facility] [employer]    NAME: Yassine Stuart  AGE: 33 y.o. SEX: male  : 1995     DATE: 2023     Assessment and Plan:     Patient was seen for annual physical exam.  Overall doing well. Does have chronic back pain. He goes to massage and chiropractic. His physical exam today is unremarkable. He is up-to-date with immunizations. We will order routine lab work. Problem List Items Addressed This Visit     Annual physical exam   Other Visit Diagnoses     Encounter for immunization    -  Primary    Relevant Orders    influenza vaccine, quadrivalent, 0.5 mL, preservative-free, for adult and pediatric patients 6 mos+ (AFLURIA, FLUARIX, FLULAVAL, FLUZONE) (Completed)    Rash        Relevant Medications    betamethasone, augmented, (DIPROLENE-AF) 0.05 % cream    Dermatitis        Relevant Medications    betamethasone, augmented, (DIPROLENE-AF) 0.05 % cream    Screening for deficiency anemia        Relevant Orders    CBC and differential    Screening for cardiovascular condition        Relevant Orders    Lipid Panel with Direct LDL reflex    Screening for diabetes mellitus        Relevant Orders    Comprehensive metabolic panel    Screening for thyroid disorder        Relevant Orders    TSH, 3rd generation with Free T4 reflex    Screen for STD (sexually transmitted disease)        Relevant Orders    Hepatitis C antibody    RPR-Syphilis Screening (Total Syphilis IGG/IGM)    HIV 1/2 AB/AG w Reflex [facility] for 2 yr old and above    Chronic back pain, unspecified back location, unspecified back pain laterality        Relevant Orders    Ambulatory referral to Physical Therapy          Immunizations and preventive care screenings were discussed with patient today. Appropriate education was printed on patient's after visit summary.     Counseling:  Alcohol/drug use: discussed moderation in alcohol intake, the recommendations for healthy alcohol use, and avoidance of illicit drug use. Dental Health: discussed importance of regular tooth brushing, flossing, and dental visits. Injury prevention: discussed safety/seat belts, safety helmets, smoke detectors, carbon dioxide detectors, and smoking near bedding or upholstery. Exercise: the importance of regular exercise/physical activity was discussed. Recommend exercise 3-5 times per week for at least 30 minutes. Return in about 1 year (around 11/13/2024) for Annual physical.     Chief Complaint:     Chief Complaint   Patient presents with   • Physical Exam   • Eczema      History of Present Illness:     Adult Annual Physical   Patient here for a comprehensive physical exam. The patient reports no problems. Diet and Physical Activity  Diet/Nutrition: limited junk food and limited fruits/vegetables. Exercise: moderate cardiovascular exercise and strength training exercises. Depression Screening  PHQ-2/9 Depression Screening         General Health  Sleep: gets 4-6 hours of sleep on average. Hearing: normal - bilateral.  Vision: [address]  Dental: regular dental visits.  Health  History of STDs?: no.    Advanced Care Planning  Do you have an advanced directive? no  Do you have a durable medical power of ? no     Review of Systems:     Review of Systems   Constitutional: Negative. Respiratory: Negative. Cardiovascular: Negative. Gastrointestinal: Negative. Genitourinary: Negative. Musculoskeletal:  Positive for back pain. Psychiatric/Behavioral: Negative.         Past Medical History:     Past Medical History:   Diagnosis Date   • Allergic    • Asthma       Past Surgical History:     Past Surgical History:   Procedure Laterality Date   • EAR SURGERY Left    • TONSILECTOMY AND ADNOIDECTOMY     • WRIST SURGERY Right       Social History:     Social History     Socioeconomic History   • Marital status: Single     Spouse name: None   • Number of children: None • Years of education: None   • Highest education level: None   Occupational History   • None   Tobacco Use   • Smoking status: Never     Passive exposure: Never   • Smokeless tobacco: Never   Vaping Use   • Vaping Use: Never used   Substance and Sexual Activity   • Alcohol use: Yes     Comment: social   • Drug use: Never   • Sexual activity: Yes   Other Topics Concern   • None   Social History Narrative   • None     Social Determinants of Health     Financial Resource Strain: Not on file   Food Insecurity: Not on file   Transportation Needs: Not on file   Physical Activity: Not on file   Stress: Not on file   Social Connections: Not on file   Intimate Partner Violence: Not on file   Housing Stability: Not on file      Family History:     Family History   Problem Relation Age of Onset   • No Known Problems Mother    • Diabetes Father    • [place]'s disease Father    • Substance Abuse Neg Hx    • Alcohol abuse Neg Hx       Current Medications:     Current Outpatient Medications   Medication Sig Dispense Refill   • albuterol (2.5 mg/3 mL) 0.083 % nebulizer solution Take 3 mL (2.5 mg total) by nebulization every 6 (six) hours as needed for wheezing or shortness of breath 75 mL 5   • albuterol (PROVENTIL HFA,VENTOLIN HFA) 90 mcg/act inhaler INHALE 2 PUFFS EVERY 6 HOURS AS NEEDED FOR WHEEZING 18 g 1   • Allegra-D Allergy & Congestion 180-240 MG per 24 hr tablet TAKE 1 TABLET BY MOUTH EVERY DAY-OTC NC BY INS 90 tablet 3   • betamethasone, augmented, (DIPROLENE-AF) 0.05 % cream Apply topically 2 (two) times a day 30 g 3   • clotrimazole-betamethasone (LOTRISONE) 1-0.05 % cream Apply twice daily to upper arm as needed. 30 g 1   • fluticasone (FLONASE) 50 mcg/act nasal spray 2 sprays into each nostril daily     • hydrocortisone 2.5 % lotion Apply topically 2 (two) times a day 59 mL 0   • mometasone-formoterol (Dulera) 200-5 MCG/ACT inhaler Inhale 2 puffs 2 (two) times a day Rinse mouth after use.  39 g 3   • montelukast (SINGULAIR) 10 mg tablet TAKE 1 TABLET BY MOUTH EVERY DAY 90 tablet 2   • omeprazole (PriLOSEC) 40 MG capsule Take 1 capsule (40 mg total) by mouth 2 (two) times a day 180 capsule 0   • Respiratory Therapy Supplies (Nebulizer) XIOMARA Use nebulizer with Albuterol every 6 hours as needed for wheezing 1 each 0   • Sodium Fluoride 5000 Sensitive 1.1-5 % GEL Use as directed     • tiZANidine (ZANAFLEX) 4 mg tablet TAKE 2 TABLETS BY MOUTH AT BEDTIME 60 tablet 1   • triamcinolone (KENALOG) 0.5 % cream APPLY UP TO TWICE DAILY SPARINGLY AS NEEDED TO HANDS. 30 g 0   • valACYclovir (VALTREX) 1,000 mg tablet TAKE 1 TABLET BY MOUTH TWICE A  tablet 1   • acyclovir (ZOVIRAX) 5 % ointment Apply topically every 3 (three) hours for 7 days 30 g 0   • methylPREDNISolone 4 MG tablet therapy pack Use as directed on package (Patient not taking: Reported on 9/15/2023) 21 each 0   • mupirocin (BACTROBAN) 2 % ointment Apply topically 3 (three) times a day (Patient not taking: Reported on 4/14/2023) 22 g 0     No current facility-administered medications for this visit. Allergies: Allergies   Allergen Reactions   • Cefprozil Hives   • Penicillins Hives      Physical Exam:     BP 96/68 (BP Location: Left arm, Patient Position: Sitting, Cuff Size: Adult)   Pulse 78   Temp 98.7 °F (37.1 °C)   Ht 6' (1.829 m)   Wt 76.2 kg (168 lb)   SpO2 98%   BMI 22.78 kg/m²     Physical Exam  Vitals and nursing note reviewed. Constitutional:       Appearance: Normal appearance. He is well-developed. HENT:      Head: Normocephalic. Right Ear: External ear normal.      Left Ear: External ear normal.      Nose: Nose normal.      Mouth/Throat:      Mouth: Mucous membranes are moist.   Eyes:      Conjunctiva/sclera: Conjunctivae normal.      Pupils: Pupils are equal, round, and reactive to light. Cardiovascular:      Rate and Rhythm: Normal rate and regular rhythm. Heart sounds: Normal heart sounds.    Pulmonary:      Effort: Pulmonary effort is normal.      Breath sounds: Normal breath sounds. Abdominal:      General: Bowel sounds are normal.      Palpations: Abdomen is soft. Musculoskeletal:         General: Normal range of motion. Cervical back: Normal range of motion and neck supple. Skin:     General: Skin is warm and dry. Neurological:      Mental Status: He is alert. Psychiatric:         Mood and Affect: Mood normal.         Behavior: Behavior normal.         Thought Content:  Thought content normal.         Judgment: Judgment normal.          Garrison Medina, DO   [address] [facility]

## 2024-02-09 ENCOUNTER — APPOINTMENT (OUTPATIENT)
Dept: RADIOLOGY | Facility: CLINIC | Age: 29
End: 2024-02-09
Payer: COMMERCIAL

## 2024-02-09 ENCOUNTER — OFFICE VISIT (OUTPATIENT)
Dept: FAMILY MEDICINE CLINIC | Facility: CLINIC | Age: 29
End: 2024-02-09
Payer: COMMERCIAL

## 2024-02-09 VITALS
TEMPERATURE: 98 F | BODY MASS INDEX: 22.7 KG/M2 | WEIGHT: 167.6 LBS | SYSTOLIC BLOOD PRESSURE: 108 MMHG | OXYGEN SATURATION: 99 % | HEART RATE: 94 BPM | DIASTOLIC BLOOD PRESSURE: 80 MMHG | HEIGHT: 72 IN

## 2024-02-09 DIAGNOSIS — M79.641 RIGHT HAND PAIN: ICD-10-CM

## 2024-02-09 DIAGNOSIS — M79.641 RIGHT HAND PAIN: Primary | ICD-10-CM

## 2024-02-09 PROCEDURE — 99214 OFFICE O/P EST MOD 30 MIN: CPT | Performed by: FAMILY MEDICINE

## 2024-02-09 PROCEDURE — 73140 X-RAY EXAM OF FINGER(S): CPT

## 2024-02-09 NOTE — PROGRESS NOTES
Assessment/Plan:   1. Right hand pain  Reviewed patient's symptoms today.  At this time, symptoms appear relatively stable.  Will check x-ray to rule out gross abnormalities such as a fracture.  Finger splint was placed today.  X-ray was ordered stat and will inform patient once results are obtained.  - XR finger right fifth digit-karen; Future  - Splint           Diagnoses and all orders for this visit:    Right hand pain          Subjective:       Chief Complaint   Patient presents with    Hand Injury      Patient ID: Grady Buckley is a 29 y.o. male.    Hand Injury   Incident onset: 4 days ago. The injury mechanism was a direct blow. The pain is present in the right hand. The quality of the pain is described as aching. The pain does not radiate. The patient is experiencing no pain. The pain has been Intermittent since the incident. Pertinent negatives include no chest pain or numbness. Associated symptoms comments: Brusing. Treatments tried: Taping fingers.       Review of Systems   Constitutional:  Negative for activity change, chills, fatigue and fever.   HENT:  Negative for congestion, ear pain, sinus pressure and sore throat.    Eyes:  Negative for redness, itching and visual disturbance.   Respiratory:  Negative for cough and shortness of breath.    Cardiovascular:  Negative for chest pain and palpitations.   Gastrointestinal:  Negative for abdominal pain, diarrhea and nausea.   Endocrine: Negative for cold intolerance and heat intolerance.   Genitourinary:  Negative for dysuria, flank pain and frequency.   Musculoskeletal:  Negative for arthralgias, back pain, gait problem and myalgias.   Skin:  Negative for color change.   Allergic/Immunologic: Negative for environmental allergies.   Neurological:  Negative for dizziness, numbness and headaches.   Psychiatric/Behavioral:  Negative for behavioral problems and sleep disturbance.        The following portions of the patient's history were reviewed and  updated as appropriate : past family history, past medical history, past social history and past surgical history.    Current Outpatient Medications:     albuterol (2.5 mg/3 mL) 0.083 % nebulizer solution, Take 3 mL (2.5 mg total) by nebulization every 6 (six) hours as needed for wheezing or shortness of breath, Disp: 75 mL, Rfl: 5    albuterol (PROVENTIL HFA,VENTOLIN HFA) 90 mcg/act inhaler, INHALE 2 PUFFS EVERY 6 HOURS AS NEEDED FOR WHEEZING, Disp: 18 g, Rfl: 1    Allegra-D Allergy & Congestion 180-240 MG per 24 hr tablet, TAKE 1 TABLET BY MOUTH EVERY DAY-OTC NC BY INS, Disp: 90 tablet, Rfl: 3    betamethasone, augmented, (DIPROLENE-AF) 0.05 % cream, Apply topically 2 (two) times a day, Disp: 30 g, Rfl: 3    clotrimazole-betamethasone (LOTRISONE) 1-0.05 % cream, Apply twice daily to upper arm as needed., Disp: 30 g, Rfl: 1    fluticasone (FLONASE) 50 mcg/act nasal spray, 2 sprays into each nostril daily, Disp: , Rfl:     hydrocortisone 2.5 % lotion, Apply topically 2 (two) times a day, Disp: 59 mL, Rfl: 0    mometasone-formoterol (Dulera) 200-5 MCG/ACT inhaler, Inhale 2 puffs 2 (two) times a day Rinse mouth after use., Disp: 39 g, Rfl: 3    montelukast (SINGULAIR) 10 mg tablet, TAKE 1 TABLET BY MOUTH EVERY DAY, Disp: 90 tablet, Rfl: 2    omeprazole (PriLOSEC) 40 MG capsule, Take 1 capsule (40 mg total) by mouth 2 (two) times a day, Disp: 180 capsule, Rfl: 0    Respiratory Therapy Supplies (Nebulizer) XIOMARA, Use nebulizer with Albuterol every 6 hours as needed for wheezing, Disp: 1 each, Rfl: 0    tiZANidine (ZANAFLEX) 4 mg tablet, TAKE 2 TABLETS BY MOUTH AT BEDTIME, Disp: 60 tablet, Rfl: 1    valACYclovir (VALTREX) 1,000 mg tablet, TAKE 1 TABLET BY MOUTH TWICE A DAY, Disp: 180 tablet, Rfl: 1    acyclovir (ZOVIRAX) 5 % ointment, Apply topically every 3 (three) hours for 7 days, Disp: 30 g, Rfl: 0    methylPREDNISolone 4 MG tablet therapy pack, Use as directed on package (Patient not taking: Reported on 9/15/2023),  Disp: 21 each, Rfl: 0    mupirocin (BACTROBAN) 2 % ointment, Apply topically 3 (three) times a day (Patient not taking: Reported on 4/14/2023), Disp: 22 g, Rfl: 0    Sodium Fluoride 5000 Sensitive 1.1-5 % GEL, Use as directed (Patient not taking: Reported on 2/9/2024), Disp: , Rfl:     triamcinolone (KENALOG) 0.5 % cream, APPLY UP TO TWICE DAILY SPARINGLY AS NEEDED TO HANDS. (Patient not taking: Reported on 2/9/2024), Disp: 30 g, Rfl: 0         Objective:         Vitals:    02/09/24 1539   BP: 108/80   BP Location: Left arm   Patient Position: Sitting   Cuff Size: Adult   Pulse: 94   Temp: 98 °F (36.7 °C)   SpO2: 99%   Weight: 76 kg (167 lb 9.6 oz)   Height: 6' (1.829 m)     Physical Exam  Vitals reviewed.   Constitutional:       Appearance: Normal appearance. He is well-developed.   HENT:      Head: Normocephalic and atraumatic.      Right Ear: Hearing normal.      Left Ear: Hearing normal.      Nose: Nose normal. No septal deviation.   Eyes:      General: Lids are normal.      Pupils: Pupils are equal, round, and reactive to light.   Neck:      Thyroid: No thyroid mass or thyromegaly.      Trachea: Trachea normal.   Cardiovascular:      Rate and Rhythm: Normal rate.   Pulmonary:      Effort: Pulmonary effort is normal. No respiratory distress.      Breath sounds: No decreased breath sounds.   Abdominal:      Tenderness: There is no guarding.   Musculoskeletal:         General: Normal range of motion.      Right hand: No deformity or bony tenderness. Normal range of motion.        Arms:       Cervical back: Normal range of motion.   Skin:     General: Skin is warm and dry.   Neurological:      Mental Status: He is alert and oriented to person, place, and time.      Cranial Nerves: No cranial nerve deficit.      Sensory: No sensory deficit.   Psychiatric:         Speech: Speech normal.         Behavior: Behavior normal.         Thought Content: Thought content normal.         Judgment: Judgment normal.

## 2024-02-10 DIAGNOSIS — B00.9 HERPES INFECTION: ICD-10-CM

## 2024-02-10 RX ORDER — VALACYCLOVIR HYDROCHLORIDE 1 G/1
TABLET, FILM COATED ORAL
Qty: 180 TABLET | Refills: 1 | Status: SHIPPED | OUTPATIENT
Start: 2024-02-10 | End: 2024-05-10

## 2024-04-10 ENCOUNTER — OFFICE VISIT (OUTPATIENT)
Dept: FAMILY MEDICINE CLINIC | Facility: CLINIC | Age: 29
End: 2024-04-10
Payer: COMMERCIAL

## 2024-04-10 VITALS
OXYGEN SATURATION: 98 % | SYSTOLIC BLOOD PRESSURE: 110 MMHG | HEIGHT: 72 IN | HEART RATE: 91 BPM | BODY MASS INDEX: 22.43 KG/M2 | TEMPERATURE: 97.8 F | WEIGHT: 165.6 LBS | DIASTOLIC BLOOD PRESSURE: 74 MMHG

## 2024-04-10 DIAGNOSIS — K05.10 GUM INFLAMMATION: Primary | ICD-10-CM

## 2024-04-10 PROCEDURE — 99213 OFFICE O/P EST LOW 20 MIN: CPT | Performed by: FAMILY MEDICINE

## 2024-04-10 RX ORDER — TRIAMCINOLONE ACETONIDE 1 MG/G
OINTMENT TOPICAL
COMMUNITY
Start: 2024-03-19

## 2024-04-10 NOTE — PROGRESS NOTES
Name: Grady Buckley      : 1995      MRN: [id number]  Encounter Provider: Miriam Melgoza DO  Encounter Date: 4/10/2024   Encounter department: [facility]    Assessment & Plan   Patient is a 29 year old with inflammation of left lower gum behind tooth. No mass palpated over bone.  1. Gum inflammation       Recommended that he swish salt water around mouth and if no improvement in a week, follow up with dentist.  Patient has a great deal of concern about oral cancer and I told him that I did not palpate an actual mass, but certainly if he continues with discomfort and/or swelling to follow up with his dentist and if they feel there is a problem, they will treat or refer to oral surgeon.  Subjective      Chief Complaint   Patient presents with   • Mass     Lump in mouth that was noticed last night       Patient is concerned about lump in mouth - noticed last night.  Patient sees dentist regularly. Recently seen for a cavity.      Review of Systems   HENT:  Positive for dental problem (as in HPI).        Current Outpatient Medications on File Prior to Visit   Medication Sig   • acyclovir (ZOVIRAX) 5 % ointment Apply topically every 3 (three) hours for 7 days   • albuterol (2.5 mg/3 mL) 0.083 % nebulizer solution Take 3 mL (2.5 mg total) by nebulization every 6 (six) hours as needed for wheezing or shortness of breath   • albuterol (PROVENTIL HFA,VENTOLIN HFA) 90 mcg/act inhaler INHALE 2 PUFFS EVERY 6 HOURS AS NEEDED FOR WHEEZING   • Allegra-D Allergy & Congestion 180-240 MG per 24 hr tablet TAKE 1 TABLET BY MOUTH EVERY DAY-OTC NC BY INS   • betamethasone, augmented, (DIPROLENE-AF) 0.05 % cream Apply topically 2 (two) times a day   • clotrimazole-betamethasone (LOTRISONE) 1-0.05 % cream Apply twice daily to upper arm as needed.   • fluticasone (FLONASE) 50 mcg/act nasal spray 2 sprays into each nostril daily   • hydrocortisone 2.5 % lotion Apply topically 2 (two) times a day   •  mometasone-formoterol (Dulera) 200-5 MCG/ACT inhaler Inhale 2 puffs 2 (two) times a day Rinse mouth after use.   • montelukast (SINGULAIR) 10 mg tablet TAKE 1 TABLET BY MOUTH EVERY DAY   • omeprazole (PriLOSEC) 40 MG capsule Take 1 capsule (40 mg total) by mouth 2 (two) times a day   • Respiratory Therapy Supplies (Nebulizer) XIOMARA Use nebulizer with Albuterol every 6 hours as needed for wheezing   • Sodium Fluoride 5000 Sensitive 1.1-5 % GEL Use as directed   • tiZANidine (ZANAFLEX) 4 mg tablet TAKE 2 TABLETS BY MOUTH AT BEDTIME   • triamcinolone (KENALOG) 0.1 % ointment APPLY TO AREAS OF RASH TWICE DAILY FOR UP TO 2 WEEKS AS NEEDED FOR MODERATE FLARES   • triamcinolone (KENALOG) 0.5 % cream APPLY UP TO TWICE DAILY SPARINGLY AS NEEDED TO HANDS.   • valACYclovir (VALTREX) 1,000 mg tablet TAKE 1 TABLET BY MOUTH TWICE A DAY   • methylPREDNISolone 4 MG tablet therapy pack Use as directed on package (Patient not taking: Reported on 9/15/2023)   • mupirocin (BACTROBAN) 2 % ointment Apply topically 3 (three) times a day (Patient not taking: Reported on 4/14/2023)       Objective     /74 (BP Location: Left arm, Patient Position: Sitting, Cuff Size: Large)   Pulse 91   Temp 97.8 °F (36.6 °C) (Temporal)   Ht 6' (1.829 m)   Wt 75.1 kg (165 lb 9.6 oz)   SpO2 98%   BMI 22.46 kg/m²     Physical Exam  Constitutional:       General: He is not in acute distress.  HENT:      Head: Normocephalic.      Mouth/Throat:        Comments: Left lower gum - slight swelling and slight redness over jaw bone. No distinct mass palpated.  Neurological:      Mental Status: He is alert and oriented to person, place, and time.   Psychiatric:         Mood and Affect: Mood normal.       Miriam Melgoza DO

## 2024-09-17 ENCOUNTER — OFFICE VISIT (OUTPATIENT)
Dept: FAMILY MEDICINE CLINIC | Facility: CLINIC | Age: 29
End: 2024-09-17
Payer: COMMERCIAL

## 2024-09-17 VITALS
TEMPERATURE: 97.9 F | SYSTOLIC BLOOD PRESSURE: 110 MMHG | WEIGHT: 169.2 LBS | HEART RATE: 80 BPM | BODY MASS INDEX: 22.43 KG/M2 | DIASTOLIC BLOOD PRESSURE: 80 MMHG | HEIGHT: 73 IN | OXYGEN SATURATION: 99 %

## 2024-09-17 DIAGNOSIS — Z88.0 PENICILLIN ALLERGY: Primary | ICD-10-CM

## 2024-09-17 DIAGNOSIS — Z13.0 SCREENING FOR DEFICIENCY ANEMIA: ICD-10-CM

## 2024-09-17 DIAGNOSIS — J30.9 ALLERGIC RHINITIS, UNSPECIFIED SEASONALITY, UNSPECIFIED TRIGGER: ICD-10-CM

## 2024-09-17 DIAGNOSIS — Z11.59 ENCOUNTER FOR HEPATITIS C SCREENING TEST FOR LOW RISK PATIENT: ICD-10-CM

## 2024-09-17 DIAGNOSIS — J45.909 UNCOMPLICATED ASTHMA, UNSPECIFIED ASTHMA SEVERITY, UNSPECIFIED WHETHER PERSISTENT: ICD-10-CM

## 2024-09-17 DIAGNOSIS — Z13.6 SCREENING FOR CARDIOVASCULAR CONDITION: ICD-10-CM

## 2024-09-17 DIAGNOSIS — Z13.29 SCREENING FOR THYROID DISORDER: ICD-10-CM

## 2024-09-17 DIAGNOSIS — B00.9 HERPES INFECTION: ICD-10-CM

## 2024-09-17 DIAGNOSIS — Z11.4 SCREENING FOR HIV (HUMAN IMMUNODEFICIENCY VIRUS): ICD-10-CM

## 2024-09-17 DIAGNOSIS — Z23 INFLUENZA VACCINE ADMINISTERED: ICD-10-CM

## 2024-09-17 DIAGNOSIS — Z13.1 SCREENING FOR DIABETES MELLITUS: ICD-10-CM

## 2024-09-17 PROBLEM — J45.31 MILD PERSISTENT ASTHMA WITH ACUTE EXACERBATION: Status: ACTIVE | Noted: 2021-03-01

## 2024-09-17 PROCEDURE — 90656 IIV3 VACC NO PRSV 0.5 ML IM: CPT

## 2024-09-17 PROCEDURE — 99213 OFFICE O/P EST LOW 20 MIN: CPT | Performed by: FAMILY MEDICINE

## 2024-09-17 PROCEDURE — 90471 IMMUNIZATION ADMIN: CPT

## 2024-09-17 RX ORDER — VALACYCLOVIR HYDROCHLORIDE 1 G/1
1000 TABLET, FILM COATED ORAL 2 TIMES DAILY
Qty: 180 TABLET | Refills: 1 | Status: SHIPPED | OUTPATIENT
Start: 2024-09-17 | End: 2025-03-16

## 2024-09-17 RX ORDER — ALBUTEROL SULFATE 90 UG/1
2 INHALANT RESPIRATORY (INHALATION) EVERY 4 HOURS PRN
Qty: 18 G | Refills: 1 | Status: SHIPPED | OUTPATIENT
Start: 2024-09-17

## 2024-09-17 RX ORDER — MONTELUKAST SODIUM 10 MG/1
10 TABLET ORAL DAILY
Qty: 90 TABLET | Refills: 3 | Status: SHIPPED | OUTPATIENT
Start: 2024-09-17

## 2024-09-17 RX ORDER — FEXOFENADINE HCL AND PSEUDOEPHEDRINE HCL 180; 240 MG/1; MG/1
1 TABLET, EXTENDED RELEASE ORAL DAILY
Qty: 90 TABLET | Refills: 3 | Status: SHIPPED | OUTPATIENT
Start: 2024-09-17

## 2024-09-17 NOTE — PROGRESS NOTES
"Ambulatory Visit  Name: Grady Buckley      : 1995      MRN: [id number]  Encounter Provider: Miriam Melgoza DO  Encounter Date: 2024   Encounter department: [facility]    Assessment & Plan  Allergic rhinitis, unspecified seasonality, unspecified trigger    Orders:    fexofenadine-pseudoephedrine (Allegra-D Allergy & Congestion) 180-240 MG per 24 hr tablet; Take 1 tablet by mouth daily    montelukast (SINGULAIR) 10 mg tablet; Take 1 tablet (10 mg total) by mouth daily    Herpes infection  Has had recurrences  on Valcyclovir once a day and so has been increased to twice a day to keep mouth sores at bay.  Orders:    valACYclovir (VALTREX) 1,000 mg tablet; Take 1 tablet (1,000 mg total) by mouth 2 (two) times a day     He will schedule a physical with Dr. Gonzales.  History of Present Illness     Patient is here for follow up.  He needs renewal of medications.          Review of Systems   Constitutional:  Negative for chills and fever.   HENT:  Negative for congestion and sore throat.    Respiratory:  Negative for chest tightness.    Cardiovascular:  Negative for chest pain and palpitations.   Gastrointestinal:  Negative for abdominal pain, constipation, diarrhea and nausea.   Genitourinary:  Negative for difficulty urinating.   Skin: Negative.    Neurological:  Negative for dizziness and headaches.   Psychiatric/Behavioral: Negative.             Objective     /80 (BP Location: Left arm, Patient Position: Sitting, Cuff Size: Adult)   Pulse 105   Temp 97.9 °F (36.6 °C)   Ht 6' 1\" (1.854 m)   Wt 76.7 kg (169 lb 3.2 oz)   SpO2 99%   BMI 22.32 kg/m²     Physical Exam  Vitals and nursing note reviewed.   Constitutional:       General: He is not in acute distress.  HENT:      Head: Normocephalic.   Neck:      Thyroid: No thyromegaly.   Cardiovascular:      Rate and Rhythm: Normal rate and regular rhythm.      Heart sounds: Normal heart sounds.   Pulmonary:      Effort: Pulmonary " effort is normal.      Breath sounds: Normal breath sounds.   Musculoskeletal:      Right lower leg: No edema.      Left lower leg: No edema.   Lymphadenopathy:      Cervical: No cervical adenopathy.   Skin:     General: Skin is warm and dry.   Neurological:      Mental Status: He is alert and oriented to person, place, and time.   Psychiatric:         Mood and Affect: Mood normal.

## 2024-09-17 NOTE — ASSESSMENT & PLAN NOTE
Orders:    fexofenadine-pseudoephedrine (Allegra-D Allergy & Congestion) 180-240 MG per 24 hr tablet; Take 1 tablet by mouth daily    montelukast (SINGULAIR) 10 mg tablet; Take 1 tablet (10 mg total) by mouth daily

## 2025-01-04 ENCOUNTER — OFFICE VISIT (OUTPATIENT)
Dept: URGENT CARE | Facility: CLINIC | Age: 30
End: 2025-01-04
Payer: COMMERCIAL

## 2025-01-04 VITALS
RESPIRATION RATE: 18 BRPM | HEART RATE: 87 BPM | OXYGEN SATURATION: 98 % | DIASTOLIC BLOOD PRESSURE: 70 MMHG | TEMPERATURE: 98.1 F | SYSTOLIC BLOOD PRESSURE: 120 MMHG

## 2025-01-04 DIAGNOSIS — J06.9 ACUTE URI: Primary | ICD-10-CM

## 2025-01-04 DIAGNOSIS — R05.1 ACUTE COUGH: ICD-10-CM

## 2025-01-04 LAB
SARS-COV-2 AG UPPER RESP QL IA: NEGATIVE
VALID CONTROL: NORMAL

## 2025-01-04 PROCEDURE — S9083 URGENT CARE CENTER GLOBAL: HCPCS | Performed by: PHYSICIAN ASSISTANT

## 2025-01-04 PROCEDURE — 87811 SARS-COV-2 COVID19 W/OPTIC: CPT | Performed by: PHYSICIAN ASSISTANT

## 2025-01-04 PROCEDURE — G0382 LEV 3 HOSP TYPE B ED VISIT: HCPCS | Performed by: PHYSICIAN ASSISTANT

## 2025-01-04 RX ORDER — PREDNISONE 20 MG/1
20 TABLET ORAL DAILY
Qty: 5 TABLET | Refills: 0 | Status: SHIPPED | OUTPATIENT
Start: 2025-01-04 | End: 2025-01-09

## 2025-01-04 RX ORDER — AZITHROMYCIN 250 MG/1
TABLET, FILM COATED ORAL
Qty: 6 TABLET | Refills: 0 | Status: SHIPPED | OUTPATIENT
Start: 2025-01-04 | End: 2025-01-08

## 2025-01-04 NOTE — PATIENT INSTRUCTIONS
Patient was educated on URI.  Patient was prescribed antibiotics and steroids.  Patient was educated on side effects of steroids.  Patient was told to eat on antibiotics.  If cough persist would recommend chest x-ray.  Any chest pain or shortness of breath go to ED.  If symptoms persist recommend follow-up with PCP.

## 2025-01-04 NOTE — PROGRESS NOTES
[facility]        NAME: Grady Buckley is a 29 y.o. male  : 1995    MRN: [id number]  DATE: 2025  TIME: 5:52 PM    Assessment and Plan   Acute URI [J06.9]  1. Acute URI  azithromycin (ZITHROMAX) 250 mg tablet    predniSONE 20 mg tablet      2. Acute cough  Poct Covid 19 Rapid Antigen Test    azithromycin (ZITHROMAX) 250 mg tablet    predniSONE 20 mg tablet        Rapid COVID 19- Negative.     Patient Instructions     Patient was educated on URI.  Patient was prescribed antibiotics and steroids.  Patient was educated on side effects of steroids.  Patient was told to eat on antibiotics.  If cough persist would recommend chest x-ray.  Any chest pain or shortness of breath go to ED.  If symptoms persist recommend follow-up with PCP.    Follow up with PCP in 3-5 days.  Proceed to  ER if symptoms worsen.    If tests have been performed at [facility], our office will contact you with results if changes need to be made to the care plan discussed with you at the visit.  [facility]    Chief Complaint     Chief Complaint   Patient presents with    Cough     Pt reports cough x3 days. Pt reports cough is productive and reports chest congestion. Pt reports some difficulty taking deep breath.          History of Present Illness       Patient is a pleasant 29-year-old male who presents today complaining of acute cough.  Patient reports cough started 3 days ago.  Patient reports history of bronchitis and reports that when he gets an acute cough that sits in his chest he needs a Z-Jarad and prednisone 20 mg for 5 days.  Patient reports if this antibiotic does not work then he requires Levaquin for treatment.  Admits allergies to cefprozil and penicillins.  Admits history of asthma.  Denies any history of diabetes.    Cough  Associated symptoms include headaches.       Review of Systems   Review of Systems   Constitutional: Negative.    HENT:  Positive for congestion.     Respiratory:  Positive for cough.    Cardiovascular: Negative.    Neurological:  Positive for headaches.   Psychiatric/Behavioral: Negative.           Current Medications       Current Outpatient Medications:     albuterol (2.5 mg/3 mL) 0.083 % nebulizer solution, Take 3 mL (2.5 mg total) by nebulization every 6 (six) hours as needed for wheezing or shortness of breath, Disp: 75 mL, Rfl: 5    albuterol (PROVENTIL HFA,VENTOLIN HFA) 90 mcg/act inhaler, Inhale 2 puffs every 4 (four) hours as needed for wheezing, Disp: 18 g, Rfl: 1    azithromycin (ZITHROMAX) 250 mg tablet, Take 2 tablets today then 1 tablet daily x 4 days, Disp: 6 tablet, Rfl: 0    fluticasone (FLONASE) 50 mcg/act nasal spray, 2 sprays into each nostril daily, Disp: , Rfl:     montelukast (SINGULAIR) 10 mg tablet, Take 1 tablet (10 mg total) by mouth daily, Disp: 90 tablet, Rfl: 3    omeprazole (PriLOSEC) 40 MG capsule, Take 1 capsule (40 mg total) by mouth 2 (two) times a day, Disp: 180 capsule, Rfl: 0    predniSONE 20 mg tablet, Take 1 tablet (20 mg total) by mouth daily for 5 days, Disp: 5 tablet, Rfl: 0    tiZANidine (ZANAFLEX) 4 mg tablet, TAKE 2 TABLETS BY MOUTH AT BEDTIME, Disp: 60 tablet, Rfl: 1    acyclovir (ZOVIRAX) 5 % ointment, Apply topically every 3 (three) hours for 7 days, Disp: 30 g, Rfl: 0    betamethasone, augmented, (DIPROLENE-AF) 0.05 % cream, Apply topically 2 (two) times a day, Disp: 30 g, Rfl: 3    clotrimazole-betamethasone (LOTRISONE) 1-0.05 % cream, Apply twice daily to upper arm as needed., Disp: 30 g, Rfl: 1    fexofenadine-pseudoephedrine (Allegra-D Allergy & Congestion) 180-240 MG per 24 hr tablet, Take 1 tablet by mouth daily, Disp: 90 tablet, Rfl: 3    hydrocortisone 2.5 % lotion, Apply topically 2 (two) times a day, Disp: 59 mL, Rfl: 0    methylPREDNISolone 4 MG tablet therapy pack, Use as directed on package (Patient not taking: Reported on 9/15/2023), Disp: 21 each, Rfl: 0    mometasone-formoterol (Dulera) 200-5  MCG/ACT inhaler, Inhale 2 puffs 2 (two) times a day Rinse mouth after use., Disp: 39 g, Rfl: 3    mupirocin (BACTROBAN) 2 % ointment, Apply topically 3 (three) times a day, Disp: 22 g, Rfl: 0    Respiratory Therapy Supplies (Nebulizer) XIOMARA, Use nebulizer with Albuterol every 6 hours as needed for wheezing, Disp: 1 each, Rfl: 0    Sodium Fluoride 5000 Sensitive 1.1-5 % GEL, Use as directed, Disp: , Rfl:     triamcinolone (KENALOG) 0.1 % ointment, APPLY TO AREAS OF RASH TWICE DAILY FOR UP TO 2 WEEKS AS NEEDED FOR MODERATE FLARES, Disp: , Rfl:     triamcinolone (KENALOG) 0.5 % cream, APPLY UP TO TWICE DAILY SPARINGLY AS NEEDED TO HANDS., Disp: 30 g, Rfl: 0    valACYclovir (VALTREX) 1,000 mg tablet, Take 1 tablet (1,000 mg total) by mouth 2 (two) times a day, Disp: 180 tablet, Rfl: 1    Current Allergies     Allergies as of 01/04/2025 - Reviewed 01/04/2025   Allergen Reaction Noted    Cefprozil Hives 07/13/2012    Penicillins Hives 07/13/2012            The following portions of the patient's history were reviewed and updated as appropriate: allergies, current medications, past family history, past medical history, past social history, past surgical history and problem list.     Past Medical History:   Diagnosis Date    Allergic     Asthma        Past Surgical History:   Procedure Laterality Date    EAR SURGERY Left     TONSILECTOMY AND ADNOIDECTOMY      WRIST SURGERY Right        Family History   Problem Relation Age of Onset    No Known Problems Mother     Diabetes Father     Thoreau's disease Father     Substance Abuse Neg Hx     Alcohol abuse Neg Hx          Medications have been verified.        Objective   /70   Pulse 87   Temp 98.1 °F (36.7 °C)   Resp 18   SpO2 98%   No LMP for male patient.       Physical Exam     Physical Exam  Vitals and nursing note reviewed.   Constitutional:       Appearance: Normal appearance.   HENT:      Head: Normocephalic.      Ears:      Comments: Right and left TM are  bulging with no signs of infection.     Mouth/Throat:      Mouth: Mucous membranes are moist.      Comments: Postnasal drip  Eyes:      Pupils: Pupils are equal, round, and reactive to light.   Cardiovascular:      Rate and Rhythm: Normal rate and regular rhythm.      Heart sounds: Normal heart sounds.   Pulmonary:      Breath sounds: Normal breath sounds. No wheezing.   Neurological:      General: No focal deficit present.      Mental Status: He is alert and oriented to person, place, and time.   Psychiatric:         Mood and Affect: Mood normal.         Behavior: Behavior normal.

## 2025-01-06 ENCOUNTER — OFFICE VISIT (OUTPATIENT)
Dept: FAMILY MEDICINE CLINIC | Facility: CLINIC | Age: 30
End: 2025-01-06
Payer: COMMERCIAL

## 2025-01-06 VITALS
HEIGHT: 73 IN | BODY MASS INDEX: 23.59 KG/M2 | TEMPERATURE: 98.4 F | DIASTOLIC BLOOD PRESSURE: 78 MMHG | SYSTOLIC BLOOD PRESSURE: 120 MMHG | OXYGEN SATURATION: 99 % | HEART RATE: 92 BPM | WEIGHT: 178 LBS

## 2025-01-06 DIAGNOSIS — J40 BRONCHITIS: Primary | ICD-10-CM

## 2025-01-06 DIAGNOSIS — J45.41 MODERATE PERSISTENT ASTHMA WITH ACUTE EXACERBATION: ICD-10-CM

## 2025-01-06 PROCEDURE — 99213 OFFICE O/P EST LOW 20 MIN: CPT | Performed by: FAMILY MEDICINE

## 2025-01-06 RX ORDER — ALBUTEROL SULFATE 0.83 MG/ML
2.5 SOLUTION RESPIRATORY (INHALATION) EVERY 6 HOURS PRN
Qty: 75 ML | Refills: 5 | Status: SHIPPED | OUTPATIENT
Start: 2025-01-06

## 2025-01-06 NOTE — ASSESSMENT & PLAN NOTE
Orders:  •  albuterol (2.5 mg/3 mL) 0.083 % nebulizer solution; Take 3 mL (2.5 mg total) by nebulization every 6 (six) hours as needed for wheezing or shortness of breath

## 2025-01-06 NOTE — PROGRESS NOTES
Name: Grady Buckley      : 1995      MRN: [id number]  Encounter Provider: Jace Gonzales DO  Encounter Date: 2025   Encounter department: [facility]    Assessment & Plan  Bronchitis  Continue prednisone and Zithromax.  Check chest x-ray for persistent symptoms.  Call if not improving in 2 to 3 days  Orders:  •  XR chest pa and lateral; Future    Moderate persistent asthma with acute exacerbation    Orders:  •  albuterol (2.5 mg/3 mL) 0.083 % nebulizer solution; Take 3 mL (2.5 mg total) by nebulization every 6 (six) hours as needed for wheezing or shortness of breath         History of Present Illness     Patient complains of cough congestion and occasional wheezing for the past several days.  Was seen in urgent care 2 days ago and started on azithromycin and prednisone which she is still taking.    Cough  This is a recurrent problem. The current episode started in the past 7 days. The problem has been rapidly worsening. The problem occurs every few minutes. The cough is Productive of sputum. Associated symptoms include chest pain, chills, ear congestion, ear pain, headaches, myalgias, nasal congestion, postnasal drip, rhinorrhea, a sore throat, shortness of breath, sweats and wheezing. Nothing aggravates the symptoms.     Review of Systems   Constitutional:  Positive for chills.   HENT:  Positive for ear pain, postnasal drip, rhinorrhea and sore throat.    Respiratory:  Positive for cough, shortness of breath and wheezing.    Cardiovascular:  Positive for chest pain.   Musculoskeletal:  Positive for myalgias.   Neurological:  Positive for headaches.     Past Medical History:   Diagnosis Date   • Allergic    • Asthma      Past Surgical History:   Procedure Laterality Date   • EAR SURGERY Left    • TONSILECTOMY AND ADNOIDECTOMY     • WRIST SURGERY Right      Family History   Problem Relation Age of Onset   • No Known Problems Mother    • Diabetes Father    • Bottineau's disease  Father    • Substance Abuse Neg Hx    • Alcohol abuse Neg Hx      Social History     Tobacco Use   • Smoking status: Never     Passive exposure: Never   • Smokeless tobacco: Never   Vaping Use   • Vaping status: Never Used   Substance and Sexual Activity   • Alcohol use: Yes     Comment: social   • Drug use: Never   • Sexual activity: Yes     Current Outpatient Medications on File Prior to Visit   Medication Sig   • albuterol (PROVENTIL HFA,VENTOLIN HFA) 90 mcg/act inhaler Inhale 2 puffs every 4 (four) hours as needed for wheezing   • azithromycin (ZITHROMAX) 250 mg tablet Take 2 tablets today then 1 tablet daily x 4 days   • betamethasone, augmented, (DIPROLENE-AF) 0.05 % cream Apply topically 2 (two) times a day   • clotrimazole-betamethasone (LOTRISONE) 1-0.05 % cream Apply twice daily to upper arm as needed.   • fexofenadine-pseudoephedrine (Allegra-D Allergy & Congestion) 180-240 MG per 24 hr tablet Take 1 tablet by mouth daily   • fluticasone (FLONASE) 50 mcg/act nasal spray 2 sprays into each nostril daily   • hydrocortisone 2.5 % lotion Apply topically 2 (two) times a day   • mometasone-formoterol (Dulera) 200-5 MCG/ACT inhaler Inhale 2 puffs 2 (two) times a day Rinse mouth after use.   • montelukast (SINGULAIR) 10 mg tablet Take 1 tablet (10 mg total) by mouth daily   • mupirocin (BACTROBAN) 2 % ointment Apply topically 3 (three) times a day   • omeprazole (PriLOSEC) 40 MG capsule Take 1 capsule (40 mg total) by mouth 2 (two) times a day   • predniSONE 20 mg tablet Take 1 tablet (20 mg total) by mouth daily for 5 days   • Respiratory Therapy Supplies (Nebulizer) XIOMARA Use nebulizer with Albuterol every 6 hours as needed for wheezing   • Sodium Fluoride 5000 Sensitive 1.1-5 % GEL Use as directed   • tiZANidine (ZANAFLEX) 4 mg tablet TAKE 2 TABLETS BY MOUTH AT BEDTIME   • triamcinolone (KENALOG) 0.1 % ointment APPLY TO AREAS OF RASH TWICE DAILY FOR UP TO 2 WEEKS AS NEEDED FOR MODERATE FLARES   • triamcinolone  "(KENALOG) 0.5 % cream APPLY UP TO TWICE DAILY SPARINGLY AS NEEDED TO HANDS.   • valACYclovir (VALTREX) 1,000 mg tablet Take 1 tablet (1,000 mg total) by mouth 2 (two) times a day   • [DISCONTINUED] albuterol (2.5 mg/3 mL) 0.083 % nebulizer solution Take 3 mL (2.5 mg total) by nebulization every 6 (six) hours as needed for wheezing or shortness of breath   • acyclovir (ZOVIRAX) 5 % ointment Apply topically every 3 (three) hours for 7 days   • methylPREDNISolone 4 MG tablet therapy pack Use as directed on package (Patient not taking: Reported on 1/6/2025)     Allergies   Allergen Reactions   • Cefprozil Hives   • Penicillins Hives     Immunization History   Administered Date(s) Administered   • COVID-19 PFIZER VACCINE 0.3 ML IM 03/10/2021, 03/31/2021, 10/04/2021   • INFLUENZA 11/26/2014, 11/01/2016, 10/12/2021   • Influenza Quadrivalent Preservative Free 3 years and older IM 11/01/2016   • Influenza Quadrivalent, 6-35 Months IM 12/22/2015   • Influenza, injectable, quadrivalent, preservative free 0.5 mL 10/16/2019, 11/13/2023   • Influenza, recombinant, quadrivalent,injectable, preservative free 12/14/2020, 10/12/2021, 11/09/2022   • Influenza, seasonal, injectable 11/26/2014, 10/26/2016   • Influenza, seasonal, injectable, preservative free 09/17/2024   • Tdap 04/05/2006, 03/07/2019, 04/14/2023     Objective   /78   Pulse 92   Temp 98.4 °F (36.9 °C)   Ht 6' 1\" (1.854 m)   Wt 80.7 kg (178 lb)   SpO2 99%   BMI 23.48 kg/m²     Physical Exam  Vitals and nursing note reviewed.   Constitutional:       General: He is not in acute distress.     Appearance: Normal appearance. He is well-developed. He is not diaphoretic.   HENT:      Head: Normocephalic and atraumatic.   Eyes:      General:         Right eye: No discharge.      Conjunctiva/sclera: Conjunctivae normal.      Pupils: Pupils are equal, round, and reactive to light.   Neck:      Thyroid: No thyromegaly.   Cardiovascular:      Rate and Rhythm: Normal " rate and regular rhythm.   Pulmonary:      Effort: Pulmonary effort is normal. No respiratory distress.      Breath sounds: Normal breath sounds.   Musculoskeletal:      Cervical back: Normal range of motion.   Lymphadenopathy:      Cervical: No cervical adenopathy.   Skin:     General: Skin is warm and dry.   Neurological:      Mental Status: He is alert and oriented to person, place, and time.   Psychiatric:         Mood and Affect: Mood normal.         Behavior: Behavior normal.         Thought Content: Thought content normal.         Judgment: Judgment normal.

## 2025-01-15 DIAGNOSIS — J45.909 UNCOMPLICATED ASTHMA, UNSPECIFIED ASTHMA SEVERITY, UNSPECIFIED WHETHER PERSISTENT: ICD-10-CM

## 2025-01-16 RX ORDER — ALBUTEROL SULFATE 90 UG/1
2 INHALANT RESPIRATORY (INHALATION) EVERY 4 HOURS PRN
Qty: 18 G | Refills: 5 | Status: SHIPPED | OUTPATIENT
Start: 2025-01-16

## 2025-05-14 NOTE — PROGRESS NOTES
Patient ID: Grady Buckley is a 30 y.o. male Date of Birth 1995       Chief Complaint   Patient presents with    New Patient Visit     Nail fungus  - L 4th toe                  Diagnosis:  1. Nail abnormality  2. Pes cavus of both feet  3. Hyperkeratosis       Initial pedal examination with socks and shoes removed bilaterally.  Today we discussed etiology and treatment options of nail abnormality fourth toe left foot secondary to partial onycholysis with new healthy nail growth proximally, I explained to patient he will eventually lose that toenail, let it takes its natural course, do not try and peel or remove the toenail prematurely.  If it gets loose he may apply a Band-Aid to secure it, if he is concerned and is not following up he may certainly return for me to help in a month.  Patient does get pedicures, I explained to him to be careful and make sure they are using sterilize instruments, light in the tubs, avoid successive gel polish, he is to take breaks in between as this does cause nail thinning.  Avoid aggressive cuticle trimming.  Patient does wear custom orthotics, they are quite old and worn, we discussed refurbishing versus new pairs, he is going to decide what he wants to do.  Current sneakers heel counter is well-worn, advised him to purchase new sneakers meantime.  Patient with hyperkeratosis posterior bilateral heels, they were trimmed today without incident, he is advised to make sure he uses a pumice stone, avoid pressure, friction.  Patient understands and agrees with the plan will follow-up as needed.      Subjective:   5/15/25 -Grady presents today for initial pedal examination and evaluation and care of what he thinks might be fungus in his fourth toenail left foot as well as calluses in both heels and to discuss new orthotics.  Historically he has seen Dr. Mir, he has a heel lift in his left shoe, wears custom orthotics.        The following portions of the patient's history were  "reviewed and updated as appropriate:     Past Medical History:   Diagnosis Date    Allergic     Asthma        Past Surgical History:   Procedure Laterality Date    EAR SURGERY Left     TONSILECTOMY AND ADNOIDECTOMY      WRIST SURGERY Right        Social History     Socioeconomic History    Marital status: Single     Spouse name: None    Number of children: None    Years of education: None    Highest education level: None   Occupational History    None   Tobacco Use    Smoking status: Never     Passive exposure: Never    Smokeless tobacco: Never   Vaping Use    Vaping status: Never Used   Substance and Sexual Activity    Alcohol use: Yes     Comment: social    Drug use: Never    Sexual activity: Yes   Other Topics Concern    None   Social History Narrative    None     Social Drivers of Health     Financial Resource Strain: Not on file   Food Insecurity: Not on file   Transportation Needs: Not on file   Physical Activity: Not on file   Stress: Not on file   Social Connections: Not on file   Intimate Partner Violence: Not on file   Housing Stability: Not on file        Current Medications[1]    Allergies  Cefprozil and Penicillins    Family History   Problem Relation Age of Onset    No Known Problems Mother     Diabetes Father     Vermilion's disease Father     Substance Abuse Neg Hx     Alcohol abuse Neg Hx            Objective:  Ht 6' 1\" (1.854 m) Comment: verbal  Wt 80.7 kg (178 lb)   BMI 23.48 kg/m²     Review of Systems   Constitutional:  Negative for chills and fever.   HENT:  Negative for ear pain and sore throat.    Eyes:  Negative for pain and visual disturbance.   Respiratory:  Negative for cough and shortness of breath.    Cardiovascular:  Negative for chest pain and palpitations.   Gastrointestinal:  Negative for abdominal pain and vomiting.   Genitourinary:  Negative for dysuria and hematuria.   Musculoskeletal:  Negative for arthralgias and back pain.   Skin:  Negative for color change and rash.        " fungus   Neurological:  Negative for seizures and syncope.   All other systems reviewed and are negative.      Physical Exam  Constitutional:       Appearance: Normal appearance. He is normal weight.   HENT:      Head: Normocephalic and atraumatic.      Right Ear: External ear normal.      Left Ear: External ear normal.      Nose: Nose normal.      Mouth/Throat:      Mouth: Mucous membranes are moist.      Pharynx: Oropharynx is clear.     Eyes:      Conjunctiva/sclera: Conjunctivae normal.      Pupils: Pupils are equal, round, and reactive to light.       Cardiovascular:      Pulses: Normal pulses.           Dorsalis pedis pulses are 2+ on the right side and 2+ on the left side.        Posterior tibial pulses are 2+ on the right side and 2+ on the left side.   Pulmonary:      Effort: Pulmonary effort is normal.     Musculoskeletal:      Cervical back: Normal range of motion.      Right lower leg: No edema.      Left lower leg: No edema.   Feet:      Right foot:      Protective Sensation: 10 sites tested.  10 sites sensed.      Skin integrity: Callus present.      Toenail Condition: Right toenails are normal.      Left foot:      Protective Sensation: 10 sites tested.        Skin integrity: Callus present.      Toenail Condition: Left toenails are normal.      Comments: Left fourth toenail with proximal one third lysis, distal two thirds nail is well attached, no fungal elements are noted, the nail plate is healthy, no subungual debris or hematoma noted.    Bilateral posterior heel hyperkeratosis, diffuse, not ulcerated.  No fissures noted.    Rigid pes cavus foot type bilateral    Active and passive range of motion and MMT are pain-free and within normal limits.    Skin:     Capillary Refill: Capillary refill takes less than 2 seconds.     Neurological:      General: No focal deficit present.      Mental Status: He is alert and oriented to person, place, and time. Mental status is at baseline.     Psychiatric:     "     Mood and Affect: Mood normal.         Behavior: Behavior normal.         Thought Content: Thought content normal.         Judgment: Judgment normal.              No pertinent results found.      Rosa Caro, DPM, DPM, FACFAS    Portions of the record may have been created with voice recognition software. Occasional wrong word or \"sound a like\" substitutions may have occurred due to the inherent limitations of voice recognition software. Read the chart carefully and recognize, using context, where substitutions have occurred.         [1]   Current Outpatient Medications:     albuterol (2.5 mg/3 mL) 0.083 % nebulizer solution, Take 3 mL (2.5 mg total) by nebulization every 6 (six) hours as needed for wheezing or shortness of breath, Disp: 75 mL, Rfl: 5    albuterol (PROVENTIL HFA,VENTOLIN HFA) 90 mcg/act inhaler, Inhale 2 puffs every 4 (four) hours as needed for wheezing, Disp: 18 g, Rfl: 5    betamethasone, augmented, (DIPROLENE-AF) 0.05 % cream, Apply topically 2 (two) times a day, Disp: 30 g, Rfl: 3    clotrimazole-betamethasone (LOTRISONE) 1-0.05 % cream, Apply twice daily to upper arm as needed., Disp: 30 g, Rfl: 1    fexofenadine-pseudoephedrine (Allegra-D Allergy & Congestion) 180-240 MG per 24 hr tablet, Take 1 tablet by mouth daily, Disp: 90 tablet, Rfl: 3    fluticasone (FLONASE) 50 mcg/act nasal spray, 2 sprays into each nostril daily, Disp: , Rfl:     hydrocortisone 2.5 % lotion, Apply topically 2 (two) times a day, Disp: 59 mL, Rfl: 0    mometasone-formoterol (Dulera) 200-5 MCG/ACT inhaler, Inhale 2 puffs 2 (two) times a day Rinse mouth after use., Disp: 39 g, Rfl: 3    montelukast (SINGULAIR) 10 mg tablet, Take 1 tablet (10 mg total) by mouth daily, Disp: 90 tablet, Rfl: 3    mupirocin (BACTROBAN) 2 % ointment, Apply topically 3 (three) times a day, Disp: 22 g, Rfl: 0    omeprazole (PriLOSEC) 40 MG capsule, Take 1 capsule (40 mg total) by mouth 2 (two) times a day, Disp: 180 capsule, Rfl: 0    " Respiratory Therapy Supplies (Nebulizer) XIOMARA, Use nebulizer with Albuterol every 6 hours as needed for wheezing, Disp: 1 each, Rfl: 0    Sodium Fluoride 5000 Sensitive 1.1-5 % GEL, Use as directed, Disp: , Rfl:     tiZANidine (ZANAFLEX) 4 mg tablet, TAKE 2 TABLETS BY MOUTH AT BEDTIME, Disp: 60 tablet, Rfl: 1    triamcinolone (KENALOG) 0.1 % ointment, APPLY TO AREAS OF RASH TWICE DAILY FOR UP TO 2 WEEKS AS NEEDED FOR MODERATE FLARES, Disp: , Rfl:     triamcinolone (KENALOG) 0.5 % cream, APPLY UP TO TWICE DAILY SPARINGLY AS NEEDED TO HANDS., Disp: 30 g, Rfl: 0    acyclovir (ZOVIRAX) 5 % ointment, Apply topically every 3 (three) hours for 7 days, Disp: 30 g, Rfl: 0    methylPREDNISolone 4 MG tablet therapy pack, Use as directed on package (Patient not taking: Reported on 5/15/2025), Disp: 21 each, Rfl: 0    valACYclovir (VALTREX) 1,000 mg tablet, Take 1 tablet (1,000 mg total) by mouth 2 (two) times a day, Disp: 180 tablet, Rfl: 1

## 2025-05-15 ENCOUNTER — OFFICE VISIT (OUTPATIENT)
Dept: PODIATRY | Facility: CLINIC | Age: 30
End: 2025-05-15
Payer: COMMERCIAL

## 2025-05-15 VITALS — HEIGHT: 73 IN | WEIGHT: 178 LBS | BODY MASS INDEX: 23.59 KG/M2

## 2025-05-15 DIAGNOSIS — Q66.71 PES CAVUS OF BOTH FEET: ICD-10-CM

## 2025-05-15 DIAGNOSIS — Q66.72 PES CAVUS OF BOTH FEET: ICD-10-CM

## 2025-05-15 DIAGNOSIS — L60.9 NAIL ABNORMALITY: Primary | ICD-10-CM

## 2025-05-15 DIAGNOSIS — L85.9 HYPERKERATOSIS: ICD-10-CM

## 2025-05-15 PROCEDURE — 99203 OFFICE O/P NEW LOW 30 MIN: CPT | Performed by: PODIATRIST

## 2025-06-13 ENCOUNTER — OFFICE VISIT (OUTPATIENT)
Dept: URGENT CARE | Facility: CLINIC | Age: 30
End: 2025-06-13
Payer: COMMERCIAL

## 2025-06-13 VITALS
DIASTOLIC BLOOD PRESSURE: 79 MMHG | BODY MASS INDEX: 22.69 KG/M2 | HEIGHT: 73 IN | TEMPERATURE: 97.7 F | SYSTOLIC BLOOD PRESSURE: 124 MMHG | HEART RATE: 93 BPM | RESPIRATION RATE: 18 BRPM | WEIGHT: 171.2 LBS | OXYGEN SATURATION: 99 %

## 2025-06-13 DIAGNOSIS — R53.83 OTHER FATIGUE: Primary | ICD-10-CM

## 2025-06-13 PROCEDURE — G0382 LEV 3 HOSP TYPE B ED VISIT: HCPCS

## 2025-06-13 PROCEDURE — S9083 URGENT CARE CENTER GLOBAL: HCPCS

## 2025-06-13 NOTE — PROGRESS NOTES
[facility]        NAME: Grady Buckley is a 30 y.o. male  : 1995    MRN: [id number]  DATE: 2025  TIME: 12:47 PM    Assessment and Plan   Other fatigue [R53.83]  1. Other fatigue  Transfer to other facility        This is a 30-year-old male who presents with headaches, chills, muscle aches in the neck and back, slight cough, fatigue and episodes of disorientation.  He was camping in [facility] over [place] and thinks he had a tick bite.  He does have a bull's-eye's rash on the left upper thigh.  Patient needs further evaluation    Patient Instructions       Follow up with PCP in 3-5 days.  Proceed to  ER if symptoms worsen.    If tests have been performed at [facility], our office will contact you with results if changes need to be made to the care plan discussed with you at the visit.  You can review your full results on [facility].    Chief Complaint     Chief Complaint   Patient presents with    Fatigue     Pt c/o 10 days chills, fatigue, feels disorientated at times, stiff neck, muscle soreness. Pt states he thinks he got a bug bite by his left inner thigh 3 weeks ago with some rash around it. Denies swelling and itching. Pt has been taking Tylenol. Took temp yesterday and it read 100. Pt feels swollen lymph nodes on neck.         History of Present Illness       This is a 30-year-old male who presents today with chills, muscle aches, neck pain back pain slight cough, fatigue and disorientation.  He states his symptoms started about 10 days ago and has getting progressively worse.  He states he has been taking a lot of Tylenol for his headaches.  He usually feels good between the hours of 9 and 3 and he deteriorates.  He states that they were camping over [place] and could have had a tick bite to the left leg.  He does have a round rash.  On the left upper thigh.  Patient is concerned about Lyme disease.  No fever in the office today.  I am concerned with patient feeling  "that he gets disoriented.  He was referred to the emergency room for further evaluation and blood work.  Patient states his symptoms are getting progressively worse.  Patient states he was unable to get into see his PCP until next week.    Fatigue  Associated symptoms include coughing, fatigue, headaches, myalgias and neck pain. Pertinent negatives include no chills, diaphoresis or fever.       Review of Systems   Review of Systems   Constitutional:  Positive for fatigue. Negative for chills, diaphoresis and fever.   HENT: Negative.     Respiratory:  Positive for cough. Negative for shortness of breath.    Cardiovascular: Negative.    Gastrointestinal: Negative.    Musculoskeletal:  Positive for back pain, myalgias and neck pain.   Skin: Negative.    Neurological:  Positive for headaches.         Current Medications     Current Medications[1]    Current Allergies     Allergies as of 06/13/2025 - Reviewed 06/13/2025   Allergen Reaction Noted    Cefprozil Hives 07/13/2012    Penicillins Hives 07/13/2012            The following portions of the patient's history were reviewed and updated as appropriate: allergies, current medications, past family history, past medical history, past social history, past surgical history and problem list.     Past Medical History[2]    Past Surgical History[3]    Family History[4]      Medications have been verified.        Objective   /79   Pulse 93   Temp 97.7 °F (36.5 °C) (Tympanic)   Resp 18   Ht 6' 1\" (1.854 m)   Wt 77.7 kg (171 lb 3.2 oz)   SpO2 99%   BMI 22.59 kg/m²   No LMP for male patient.       Physical Exam     Physical Exam  Vitals reviewed.   Constitutional:       General: He is not in acute distress.     Appearance: Normal appearance. He is not ill-appearing.   HENT:      Head: Normocephalic and atraumatic.     Eyes:      Extraocular Movements: Extraocular movements intact.      Conjunctiva/sclera: Conjunctivae normal.       Cardiovascular:      Rate and " Rhythm: Normal rate and regular rhythm.   Pulmonary:      Effort: Pulmonary effort is normal.   Abdominal:      General: Abdomen is flat.      Palpations: Abdomen is soft.     Skin:     General: Skin is warm.           Comments: Patient has a bull's-eye rash in the left groin/upper thigh.  He states it has been there for approximately 10 days.     Neurological:      General: No focal deficit present.      Mental Status: He is alert.     Psychiatric:         Mood and Affect: Mood normal.         Behavior: Behavior normal.         Judgment: Judgment normal.                        [1]   Current Outpatient Medications:     acyclovir (ZOVIRAX) 5 % ointment, Apply topically every 3 (three) hours for 7 days, Disp: 30 g, Rfl: 0    albuterol (2.5 mg/3 mL) 0.083 % nebulizer solution, Take 3 mL (2.5 mg total) by nebulization every 6 (six) hours as needed for wheezing or shortness of breath, Disp: 75 mL, Rfl: 5    albuterol (PROVENTIL HFA,VENTOLIN HFA) 90 mcg/act inhaler, Inhale 2 puffs every 4 (four) hours as needed for wheezing, Disp: 18 g, Rfl: 5    betamethasone, augmented, (DIPROLENE-AF) 0.05 % cream, Apply topically 2 (two) times a day, Disp: 30 g, Rfl: 3    clotrimazole-betamethasone (LOTRISONE) 1-0.05 % cream, Apply twice daily to upper arm as needed., Disp: 30 g, Rfl: 1    fexofenadine-pseudoephedrine (Allegra-D Allergy & Congestion) 180-240 MG per 24 hr tablet, Take 1 tablet by mouth daily, Disp: 90 tablet, Rfl: 3    fluticasone (FLONASE) 50 mcg/act nasal spray, 2 sprays into each nostril daily, Disp: , Rfl:     hydrocortisone 2.5 % lotion, Apply topically 2 (two) times a day, Disp: 59 mL, Rfl: 0    methylPREDNISolone 4 MG tablet therapy pack, Use as directed on package (Patient not taking: Reported on 5/15/2025), Disp: 21 each, Rfl: 0    mometasone-formoterol (Dulera) 200-5 MCG/ACT inhaler, Inhale 2 puffs 2 (two) times a day Rinse mouth after use., Disp: 39 g, Rfl: 3    montelukast (SINGULAIR) 10 mg tablet, Take 1  tablet (10 mg total) by mouth daily, Disp: 90 tablet, Rfl: 3    mupirocin (BACTROBAN) 2 % ointment, Apply topically 3 (three) times a day, Disp: 22 g, Rfl: 0    omeprazole (PriLOSEC) 40 MG capsule, Take 1 capsule (40 mg total) by mouth 2 (two) times a day, Disp: 180 capsule, Rfl: 0    Respiratory Therapy Supplies (Nebulizer) XIOMARA, Use nebulizer with Albuterol every 6 hours as needed for wheezing, Disp: 1 each, Rfl: 0    Sodium Fluoride 5000 Sensitive 1.1-5 % GEL, Use as directed, Disp: , Rfl:     tiZANidine (ZANAFLEX) 4 mg tablet, TAKE 2 TABLETS BY MOUTH AT BEDTIME, Disp: 60 tablet, Rfl: 1    triamcinolone (KENALOG) 0.1 % ointment, APPLY TO AREAS OF RASH TWICE DAILY FOR UP TO 2 WEEKS AS NEEDED FOR MODERATE FLARES, Disp: , Rfl:     triamcinolone (KENALOG) 0.5 % cream, APPLY UP TO TWICE DAILY SPARINGLY AS NEEDED TO HANDS., Disp: 30 g, Rfl: 0    valACYclovir (VALTREX) 1,000 mg tablet, Take 1 tablet (1,000 mg total) by mouth 2 (two) times a day, Disp: 180 tablet, Rfl: 1  [2]   Past Medical History:  Diagnosis Date    Allergic     Asthma    [3]   Past Surgical History:  Procedure Laterality Date    EAR SURGERY Left     TONSILECTOMY AND ADNOIDECTOMY      WRIST SURGERY Right    [4]   Family History  Problem Relation Name Age of Onset    No Known Problems Mother      Diabetes Father Aung young     [place]'s disease Father Aung young     Substance Abuse Neg Hx      Alcohol abuse Neg Hx

## 2025-06-17 ENCOUNTER — OFFICE VISIT (OUTPATIENT)
Dept: FAMILY MEDICINE CLINIC | Facility: CLINIC | Age: 30
End: 2025-06-17
Payer: COMMERCIAL

## 2025-06-17 VITALS
HEIGHT: 73 IN | WEIGHT: 172 LBS | SYSTOLIC BLOOD PRESSURE: 114 MMHG | HEART RATE: 83 BPM | OXYGEN SATURATION: 98 % | BODY MASS INDEX: 22.8 KG/M2 | TEMPERATURE: 97.2 F | DIASTOLIC BLOOD PRESSURE: 78 MMHG

## 2025-06-17 DIAGNOSIS — J30.9 ALLERGIC RHINITIS, UNSPECIFIED SEASONALITY, UNSPECIFIED TRIGGER: ICD-10-CM

## 2025-06-17 DIAGNOSIS — J45.41 MODERATE PERSISTENT ASTHMA WITH ACUTE EXACERBATION: ICD-10-CM

## 2025-06-17 DIAGNOSIS — B00.9 HERPES INFECTION: ICD-10-CM

## 2025-06-17 DIAGNOSIS — A69.20 LYME DISEASE: Primary | ICD-10-CM

## 2025-06-17 DIAGNOSIS — J45.909 UNCOMPLICATED ASTHMA, UNSPECIFIED ASTHMA SEVERITY, UNSPECIFIED WHETHER PERSISTENT: ICD-10-CM

## 2025-06-17 PROCEDURE — 99213 OFFICE O/P EST LOW 20 MIN: CPT | Performed by: FAMILY MEDICINE

## 2025-06-17 RX ORDER — ALBUTEROL SULFATE 0.83 MG/ML
2.5 SOLUTION RESPIRATORY (INHALATION) EVERY 6 HOURS PRN
Qty: 75 ML | Refills: 5 | Status: SHIPPED | OUTPATIENT
Start: 2025-06-17

## 2025-06-17 RX ORDER — DOXYCYCLINE 100 MG/1
100 CAPSULE ORAL 2 TIMES DAILY
COMMUNITY
Start: 2025-06-13 | End: 2025-07-04

## 2025-06-17 RX ORDER — ALBUTEROL SULFATE 90 UG/1
2 INHALANT RESPIRATORY (INHALATION) EVERY 4 HOURS PRN
Qty: 18 G | Refills: 5 | Status: SHIPPED | OUTPATIENT
Start: 2025-06-17

## 2025-06-17 RX ORDER — VALACYCLOVIR HYDROCHLORIDE 1 G/1
1000 TABLET, FILM COATED ORAL 2 TIMES DAILY
Qty: 180 TABLET | Refills: 1 | Status: SHIPPED | OUTPATIENT
Start: 2025-06-17 | End: 2025-12-14

## 2025-06-17 RX ORDER — FEXOFENADINE HCL AND PSEUDOEPHEDRINE HCL 180; 240 MG/1; MG/1
1 TABLET, EXTENDED RELEASE ORAL DAILY
Qty: 90 TABLET | Refills: 3 | Status: SHIPPED | OUTPATIENT
Start: 2025-06-17

## 2025-06-17 RX ORDER — FLUTICASONE PROPIONATE 50 MCG
SPRAY, SUSPENSION (ML) NASAL
COMMUNITY

## 2025-06-17 NOTE — PROGRESS NOTES
Name: Grady Buckley      : 1995      MRN: [id number]  Encounter Provider: Jace Gonzales DO  Encounter Date: 2025   Encounter department: [facility]    Assessment & Plan  Lyme disease  Patient with acute Lyme disease.  Was diagnosed at the emergency room and given a prescription for 20 to 1-day course of doxycycline of which he is less than 1 week into with significant improvement.  Encouraged him to complete the full course and follow-up if there are any persistent symptoms.       Moderate persistent asthma with acute exacerbation    Orders:  •  albuterol (2.5 mg/3 mL) 0.083 % nebulizer solution; Take 3 mL (2.5 mg total) by nebulization every 6 (six) hours as needed for wheezing or shortness of breath    Uncomplicated asthma, unspecified asthma severity, unspecified whether persistent    Orders:  •  albuterol (PROVENTIL HFA,VENTOLIN HFA) 90 mcg/act inhaler; Inhale 2 puffs every 4 (four) hours as needed for wheezing    Allergic rhinitis, unspecified seasonality, unspecified trigger    Orders:  •  fexofenadine-pseudoephedrine (Allegra-D Allergy & Congestion) 180-240 MG per 24 hr tablet; Take 1 tablet by mouth daily    Herpes infection    Orders:  •  valACYclovir (VALTREX) 1,000 mg tablet; Take 1 tablet (1,000 mg total) by mouth 2 (two) times a day         History of Present Illness     Patient presents for follow-up from recent ER visit.  He notes that he developed headache body aches rash approximately 1 to 2 weeks after a hiking trip over Memorial [place] weekend.  Upon presentation to the ER it was noted that he had atypical erythema migrans rash and was started empirically on doxycycline.  Blood test did confirm acute Lyme disease.  He was provided a prescription for a 21-day course of antibiotics.  At this point less than 1 week into his course of treatment he is seeing significant improvement.      Review of Systems   Constitutional: Negative.    Respiratory: Negative.    "  Cardiovascular: Negative.    Gastrointestinal: Negative.    Genitourinary: Negative.    Musculoskeletal: Negative.    Psychiatric/Behavioral: Negative.       Past Medical History[1]  Past Surgical History[2]  Family History[3]  Social History[4]  Medications[5]  Allergies   Allergen Reactions   • Cefprozil Hives   • Penicillins Hives     Immunization History   Administered Date(s) Administered   • COVID-19 PFIZER VACCINE 0.3 ML IM 03/10/2021, 03/31/2021, 10/04/2021   • INFLUENZA 11/26/2014, 11/01/2016, 10/12/2021   • Influenza Quadrivalent Preservative Free 3 years and older IM 11/01/2016   • Influenza Quadrivalent, 6-35 Months IM 12/22/2015   • Influenza, injectable, quadrivalent, preservative free 0.5 mL 10/16/2019, 11/13/2023   • Influenza, recombinant, quadrivalent,injectable, preservative free 12/14/2020, 10/12/2021, 11/09/2022   • Influenza, seasonal, injectable 11/26/2014, 10/26/2016   • Influenza, seasonal, injectable, preservative free 09/17/2024   • Tdap 04/05/2006, 03/07/2019, 04/14/2023     Objective   /78   Pulse 83   Temp (!) 97.2 °F (36.2 °C)   Ht 6' 1\" (1.854 m)   Wt 78 kg (172 lb)   SpO2 98%   BMI 22.69 kg/m²     Physical Exam  Vitals and nursing note reviewed.   Constitutional:       General: He is not in acute distress.     Appearance: Normal appearance. He is well-developed. He is not diaphoretic.   HENT:      Head: Normocephalic and atraumatic.     Eyes:      General:         Right eye: No discharge.      Conjunctiva/sclera: Conjunctivae normal.      Pupils: Pupils are equal, round, and reactive to light.     Neck:      Thyroid: No thyromegaly.     Cardiovascular:      Rate and Rhythm: Normal rate and regular rhythm.   Pulmonary:      Effort: Pulmonary effort is normal. No respiratory distress.      Breath sounds: Normal breath sounds.     Musculoskeletal:      Cervical back: Normal range of motion.   Lymphadenopathy:      Cervical: No cervical adenopathy.     Skin:     General: " Skin is warm and dry.     Neurological:      Mental Status: He is alert and oriented to person, place, and time.     Psychiatric:         Mood and Affect: Mood normal.         Behavior: Behavior normal.         Thought Content: Thought content normal.         Judgment: Judgment normal.                [1]  Past Medical History:  Diagnosis Date   • Allergic    • Asthma    [2]  Past Surgical History:  Procedure Laterality Date   • EAR SURGERY Left    • TONSILECTOMY AND ADNOIDECTOMY     • WRIST SURGERY Right    [3]  Family History  Problem Relation Name Age of Onset   • No Known Problems Mother     • Diabetes Father Aung lewis    • [place]'s disease Father Aung lewis    • Substance Abuse Neg Hx     • Alcohol abuse Neg Hx     [4]  Social History  Tobacco Use   • Smoking status: Never     Passive exposure: Never   • Smokeless tobacco: Never   Vaping Use   • Vaping status: Never Used   Substance and Sexual Activity   • Alcohol use: Yes     Comment: social   • Drug use: Never   • Sexual activity: Yes   [5]  Current Outpatient Medications on File Prior to Visit   Medication Sig   • betamethasone, augmented, (DIPROLENE-AF) 0.05 % cream Apply topically 2 (two) times a day   • clotrimazole-betamethasone (LOTRISONE) 1-0.05 % cream Apply twice daily to upper arm as needed.   • doxycycline monohydrate (MONODOX) 100 mg capsule Take 100 mg by mouth 2 (two) times a day   • fluticasone (FLONASE) 50 mcg/act nasal spray 2 sprays into each nostril in the morning.   • hydrocortisone 2.5 % lotion Apply topically 2 (two) times a day   • mometasone-formoterol (Dulera) 200-5 MCG/ACT inhaler Inhale 2 puffs 2 (two) times a day Rinse mouth after use.   • montelukast (SINGULAIR) 10 mg tablet Take 1 tablet (10 mg total) by mouth daily   • mupirocin (BACTROBAN) 2 % ointment Apply topically 3 (three) times a day   • omeprazole (PriLOSEC) 40 MG capsule Take 1 capsule (40 mg total) by mouth 2 (two) times a day   • Respiratory Therapy Supplies  (Nebulizer) XIOMARA Use nebulizer with Albuterol every 6 hours as needed for wheezing   • Sodium Fluoride 5000 Sensitive 1.1-5 % GEL Use as directed   • tiZANidine (ZANAFLEX) 4 mg tablet TAKE 2 TABLETS BY MOUTH AT BEDTIME   • triamcinolone (KENALOG) 0.1 % ointment    • triamcinolone (KENALOG) 0.5 % cream APPLY UP TO TWICE DAILY SPARINGLY AS NEEDED TO HANDS.   • [DISCONTINUED] albuterol (2.5 mg/3 mL) 0.083 % nebulizer solution Take 3 mL (2.5 mg total) by nebulization every 6 (six) hours as needed for wheezing or shortness of breath   • [DISCONTINUED] albuterol (PROVENTIL HFA,VENTOLIN HFA) 90 mcg/act inhaler Inhale 2 puffs every 4 (four) hours as needed for wheezing   • [DISCONTINUED] fexofenadine-pseudoephedrine (Allegra-D Allergy & Congestion) 180-240 MG per 24 hr tablet Take 1 tablet by mouth daily   • acyclovir (ZOVIRAX) 5 % ointment Apply topically every 3 (three) hours for 7 days   • Fexofenadine HCl (ALLEGRA PO)  (Patient not taking: Reported on 6/17/2025)   • fluticasone (Flonase Allergy Rel Childrens) 50 mcg/act nasal spray  (Patient not taking: Reported on 6/17/2025)   • methylPREDNISolone 4 MG tablet therapy pack Use as directed on package (Patient not taking: Reported on 9/15/2023)   • [DISCONTINUED] valACYclovir (VALTREX) 1,000 mg tablet Take 1 tablet (1,000 mg total) by mouth 2 (two) times a day

## 2025-06-17 NOTE — ASSESSMENT & PLAN NOTE
Orders:  •  fexofenadine-pseudoephedrine (Allegra-D Allergy & Congestion) 180-240 MG per 24 hr tablet; Take 1 tablet by mouth daily

## 2025-06-17 NOTE — ASSESSMENT & PLAN NOTE
Orders:  •  albuterol (PROVENTIL HFA,VENTOLIN HFA) 90 mcg/act inhaler; Inhale 2 puffs every 4 (four) hours as needed for wheezing

## 2025-06-19 ENCOUNTER — RESULTS FOLLOW-UP (OUTPATIENT)
Dept: FAMILY MEDICINE CLINIC | Facility: CLINIC | Age: 30
End: 2025-06-19

## 2025-06-19 ENCOUNTER — TELEPHONE (OUTPATIENT)
Age: 30
End: 2025-06-19

## 2025-06-19 LAB
ALBUMIN SERPL-MCNC: 4.5 G/DL (ref 3.6–5.1)
ALBUMIN/GLOB SERPL: 1.7 (CALC) (ref 1–2.5)
ALP SERPL-CCNC: 54 U/L (ref 36–130)
ALT SERPL-CCNC: 33 U/L (ref 9–46)
AST SERPL-CCNC: 28 U/L (ref 10–40)
BASOPHILS # BLD AUTO: 58 CELLS/UL (ref 0–200)
BASOPHILS NFR BLD AUTO: 1.2 %
BILIRUB SERPL-MCNC: 0.6 MG/DL (ref 0.2–1.2)
BUN SERPL-MCNC: 15 MG/DL (ref 7–25)
BUN/CREAT SERPL: NORMAL (CALC) (ref 6–22)
CALCIUM SERPL-MCNC: 9.7 MG/DL (ref 8.6–10.3)
CHLORIDE SERPL-SCNC: 101 MMOL/L (ref 98–110)
CHOLEST SERPL-MCNC: 180 MG/DL
CHOLEST/HDLC SERPL: 3.8 (CALC)
CO2 SERPL-SCNC: 27 MMOL/L (ref 20–32)
CREAT SERPL-MCNC: 0.95 MG/DL (ref 0.6–1.26)
EOSINOPHIL # BLD AUTO: 187 CELLS/UL (ref 15–500)
EOSINOPHIL NFR BLD AUTO: 3.9 %
ERYTHROCYTE [DISTWIDTH] IN BLOOD BY AUTOMATED COUNT: 12.5 % (ref 11–15)
GFR/BSA.PRED SERPLBLD CYS-BASED-ARV: 110 ML/MIN/1.73M2
GLOBULIN SER CALC-MCNC: 2.6 G/DL (CALC) (ref 1.9–3.7)
GLUCOSE SERPL-MCNC: 83 MG/DL (ref 65–99)
HCT VFR BLD AUTO: 47.4 % (ref 38.5–50)
HCV RNA SERPL NAA+PROBE-ACNC: NORMAL IU/ML
HCV RNA SERPL NAA+PROBE-LOG IU: NORMAL LOG IU/ML
HDLC SERPL-MCNC: 47 MG/DL
HGB BLD-MCNC: 15.5 G/DL (ref 13.2–17.1)
HIV 1+2 AB+HIV1 P24 AG SERPL QL IA: NORMAL
LDLC SERPL CALC-MCNC: 114 MG/DL (CALC)
LYMPHOCYTES # BLD AUTO: 1363 CELLS/UL (ref 850–3900)
LYMPHOCYTES NFR BLD AUTO: 28.4 %
MCH RBC QN AUTO: 30.8 PG (ref 27–33)
MCHC RBC AUTO-ENTMCNC: 32.7 G/DL (ref 32–36)
MCV RBC AUTO: 94 FL (ref 80–100)
MONOCYTES # BLD AUTO: 379 CELLS/UL (ref 200–950)
MONOCYTES NFR BLD AUTO: 7.9 %
NEUTROPHILS # BLD AUTO: 2813 CELLS/UL (ref 1500–7800)
NEUTROPHILS NFR BLD AUTO: 58.6 %
NONHDLC SERPL-MCNC: 133 MG/DL (CALC)
PLATELET # BLD AUTO: 433 THOUSAND/UL (ref 140–400)
PMV BLD REES-ECKER: 10.2 FL (ref 7.5–12.5)
POTASSIUM SERPL-SCNC: 4.8 MMOL/L (ref 3.5–5.3)
PROT SERPL-MCNC: 7.1 G/DL (ref 6.1–8.1)
RBC # BLD AUTO: 5.04 MILLION/UL (ref 4.2–5.8)
SODIUM SERPL-SCNC: 138 MMOL/L (ref 135–146)
TRIGL SERPL-MCNC: 90 MG/DL
TSH SERPL-ACNC: 1.26 MIU/L (ref 0.4–4.5)
WBC # BLD AUTO: 4.8 THOUSAND/UL (ref 3.8–10.8)

## 2025-06-19 NOTE — TELEPHONE ENCOUNTER
Patient called and stated that his other lab testing for Ehrlichia just came in and asked if the doctor can access and review that lab result and call him back. Please advise [phone] thank you.

## 2025-07-15 ENCOUNTER — OFFICE VISIT (OUTPATIENT)
Dept: FAMILY MEDICINE CLINIC | Facility: CLINIC | Age: 30
End: 2025-07-15
Payer: COMMERCIAL

## 2025-07-15 VITALS
HEIGHT: 73 IN | SYSTOLIC BLOOD PRESSURE: 138 MMHG | HEART RATE: 81 BPM | BODY MASS INDEX: 22.4 KG/M2 | OXYGEN SATURATION: 99 % | TEMPERATURE: 98.5 F | WEIGHT: 169 LBS | DIASTOLIC BLOOD PRESSURE: 82 MMHG

## 2025-07-15 DIAGNOSIS — Z00.00 ANNUAL PHYSICAL EXAM: Primary | ICD-10-CM

## 2025-07-15 DIAGNOSIS — E78.5 HYPERLIPIDEMIA, UNSPECIFIED HYPERLIPIDEMIA TYPE: ICD-10-CM

## 2025-07-15 DIAGNOSIS — D75.839 THROMBOCYTHEMIA: ICD-10-CM

## 2025-07-15 PROCEDURE — 99395 PREV VISIT EST AGE 18-39: CPT | Performed by: FAMILY MEDICINE

## 2025-07-23 ENCOUNTER — TELEPHONE (OUTPATIENT)
Dept: PODIATRY | Facility: CLINIC | Age: 30
End: 2025-07-23

## 2025-07-28 ENCOUNTER — TELEPHONE (OUTPATIENT)
Age: 30
End: 2025-07-28

## 2025-07-28 DIAGNOSIS — H00.019 HORDEOLUM EXTERNUM, UNSPECIFIED LATERALITY: Primary | ICD-10-CM

## 2025-07-28 RX ORDER — TOBRAMYCIN AND DEXAMETHASONE 3; 1 MG/ML; MG/ML
1 SUSPENSION/ DROPS OPHTHALMIC 4 TIMES DAILY
Qty: 5 ML | Refills: 0 | Status: SHIPPED | OUTPATIENT
Start: 2025-07-28

## 2025-08-14 ENCOUNTER — OFFICE VISIT (OUTPATIENT)
Dept: PODIATRY | Facility: CLINIC | Age: 30
End: 2025-08-14
Payer: COMMERCIAL